# Patient Record
Sex: MALE | Race: WHITE | NOT HISPANIC OR LATINO | Employment: FULL TIME | URBAN - METROPOLITAN AREA
[De-identification: names, ages, dates, MRNs, and addresses within clinical notes are randomized per-mention and may not be internally consistent; named-entity substitution may affect disease eponyms.]

---

## 2021-09-17 ENCOUNTER — OFFICE VISIT (OUTPATIENT)
Dept: FAMILY MEDICINE CLINIC | Facility: CLINIC | Age: 31
End: 2021-09-17

## 2021-09-17 VITALS
HEIGHT: 71 IN | WEIGHT: 190.8 LBS | HEART RATE: 89 BPM | TEMPERATURE: 97.5 F | BODY MASS INDEX: 26.71 KG/M2 | SYSTOLIC BLOOD PRESSURE: 134 MMHG | DIASTOLIC BLOOD PRESSURE: 100 MMHG | OXYGEN SATURATION: 98 %

## 2021-09-17 DIAGNOSIS — Z11.59 NEED FOR HEPATITIS C SCREENING TEST: ICD-10-CM

## 2021-09-17 DIAGNOSIS — Z00.00 ENCOUNTER FOR ANNUAL PHYSICAL EXAM: Primary | ICD-10-CM

## 2021-09-17 DIAGNOSIS — R00.2 PALPITATIONS: ICD-10-CM

## 2021-09-17 DIAGNOSIS — R03.0 ELEVATED BLOOD-PRESSURE READING WITHOUT DIAGNOSIS OF HYPERTENSION: ICD-10-CM

## 2021-09-17 PROCEDURE — 99385 PREV VISIT NEW AGE 18-39: CPT | Performed by: NURSE PRACTITIONER

## 2021-09-17 PROCEDURE — 93000 ELECTROCARDIOGRAM COMPLETE: CPT | Performed by: NURSE PRACTITIONER

## 2021-09-17 RX ORDER — DIPHENOXYLATE HYDROCHLORIDE AND ATROPINE SULFATE 2.5; .025 MG/1; MG/1
TABLET ORAL DAILY
COMMUNITY
End: 2023-03-17

## 2021-09-17 NOTE — PATIENT INSTRUCTIONS
Monitor your blood pressure 2-3 times per week and record results.  Call if your blood pressure is consistently greater than 140/90.  Watch salt in your diet.  Continue to work on healthy diet and exercise.  Follow up pending lab results.  Follow up in 1 month, or sooner if problems or concerns.

## 2021-09-17 NOTE — PROGRESS NOTES
Subjective   Christ Hummel is a 31 y.o. male.     Chief Complaint   Patient presents with   • Annual Exam       History of Present Illness   New patient, here to establish care; no previous PCP; patient presents for CPE with fasting labs; pretty healthy diet; regular exercise, runs about 4-11 miles 4 days per week; goes to gym some and will take preworkout supplement prior to workout; has about 500 mg caffeine daily; regular dental visits; last eye exam about 3 years ago; no problems hearing; immunizations: needs Tdap, declines today; colonoscopy never performed; no family history of colon cancer.    Has been doing a lot of long distance running and HR seems to get high when running; has been running for about 2 years, just HR seems to be higher than in past; symptoms started about 6 months ago; will have to stop and walk at times due to SOA when HR higher than usual; may be related to heat or dehydration; when running HR will get up to 180s and will come back to baseline with rest; no noted irregular HR; has had some stress recently and at times of stress will note some palpitations; does not have caffeine prior to runs.    Brother diagnosed with testicular cancer last year; had lump in abdomen and neck; testicular cancer diagnosed as primary; pt does not have any noted lumps or abnormalities.    The following portions of the patient's history were reviewed and updated as appropriate: allergies, current medications, past family history, past medical history, past social history, past surgical history and problem list.    Current Outpatient Medications on File Prior to Visit   Medication Sig   • multivitamin (MULTI-VITAMIN DAILY PO) Take  by mouth Daily.     No current facility-administered medications on file prior to visit.        History reviewed. No pertinent past medical history.    Past Surgical History:   Procedure Laterality Date   • NO PAST SURGERIES         Family History   Problem Relation Age of Onset   •  Testicular cancer Brother 35   • Anemia Mother    • No Known Problems Father    • Coronary artery disease Paternal Grandmother        Social History     Socioeconomic History   • Marital status:      Spouse name: Not on file   • Number of children: Not on file   • Years of education: Not on file   • Highest education level: Not on file   Tobacco Use   • Smoking status: Never Smoker   • Smokeless tobacco: Never Used   Substance and Sexual Activity   • Alcohol use: Yes     Alcohol/week: 2.0 - 4.0 standard drinks     Types: 2 - 4 Cans of beer per week     Comment: socially   • Drug use: Never   • Sexual activity: Yes     Partners: Female     Birth control/protection: OCP       Review of Systems   Constitutional: Negative for appetite change, chills, fatigue (no unexplained fatigue), fever, unexpected weight gain and unexpected weight loss.   HENT: Negative for ear pain, sinus pressure, sore throat and trouble swallowing.    Eyes: Negative for blurred vision and pain.   Respiratory: Negative for cough and chest tightness. Shortness of breath: no problems with daily activities.    Cardiovascular: Negative for chest pain. Palpitations: see HPI.   Gastrointestinal: Negative for abdominal pain, blood in stool, constipation, diarrhea (rare, diet related) and GERD. Indigestion: occasional heartburn if not eating healthy.   Endocrine: Negative for cold intolerance and polydipsia (always thirsty, no change). Heat intolerance: some.   Genitourinary: Negative for dysuria, frequency, hematuria, nocturia, scrotal swelling and testicular pain.   Musculoskeletal: Negative for arthralgias, back pain and neck pain.   Skin: Negative for rash and skin lesions.   Neurological: Negative for dizziness, syncope, weakness, light-headedness and headache (rare bad headache with vomiting when a little dehydrated or feeling hot; maybe happens a couple of times per year).   Hematological: Does not bruise/bleed easily.  "  Psychiatric/Behavioral: Negative for depressed mood.     PHQ-9 Depression Screening  Little interest or pleasure in doing things? 0   Feeling down, depressed, or hopeless? 0   Trouble falling or staying asleep, or sleeping too much? 0   Feeling tired or having little energy? 0   Poor appetite or overeating? 0   Feeling bad about yourself - or that you are a failure or have let yourself or your family down? 0   Trouble concentrating on things, such as reading the newspaper or watching television? 1   Moving or speaking so slowly that other people could have noticed? Or the opposite - being so fidgety or restless that you have been moving around a lot more than usual? 0   Thoughts that you would be better off dead, or of hurting yourself in some way? 0   PHQ-9 Total Score 1   If you checked off any problems, how difficult have these problems made it for you to do your work, take care of things at home, or get along with other people?         Anxiety ALLYN score--5    Objective   Vitals:    09/17/21 1057 09/17/21 1202 09/17/21 1203   BP: 155/90 132/100 134/100   BP Location: Left arm Left arm Right arm   Patient Position: Sitting Sitting Sitting   Cuff Size: Adult     Pulse: 89     Temp: 97.5 °F (36.4 °C)     TempSrc: Infrared     SpO2: 98%     Weight: 86.5 kg (190 lb 12.8 oz)     Height: 180.3 cm (71\")     PainSc: 0-No pain       Body mass index is 26.61 kg/m².    Physical Exam  Vitals and nursing note reviewed.   Constitutional:       General: He is not in acute distress.     Appearance: He is well-developed and well-groomed. He is not diaphoretic.   HENT:      Head: Normocephalic and atraumatic.      Jaw: No tenderness or pain on movement (popping of right TMJ).      Right Ear: Tympanic membrane and external ear normal. No decreased hearing noted.      Left Ear: External ear normal. No decreased hearing noted. Left ear middle ear effusion: mild. Left ear injected TM: mild.      Nose: Nose normal.      Right Sinus: " No maxillary sinus tenderness or frontal sinus tenderness.      Left Sinus: No maxillary sinus tenderness or frontal sinus tenderness.      Mouth/Throat:      Mouth: Mucous membranes are dry.      Pharynx: No posterior oropharyngeal erythema. Oropharyngeal exudate: drainage noted in posterior pharynx.   Eyes:      Extraocular Movements: Extraocular movements intact.      Conjunctiva/sclera: Conjunctivae normal.      Pupils: Pupils are equal, round, and reactive to light.   Neck:      Thyroid: No thyroid mass or thyromegaly.      Vascular: No carotid bruit.      Trachea: No tracheal deviation.   Cardiovascular:      Rate and Rhythm: Normal rate and regular rhythm.      Pulses: Normal pulses.      Heart sounds: Normal heart sounds. No murmur heard.     Pulmonary:      Effort: Pulmonary effort is normal. No respiratory distress.      Breath sounds: Normal breath sounds.   Abdominal:      General: Bowel sounds are normal.      Palpations: Abdomen is soft. There is no hepatomegaly or splenomegaly.      Tenderness: There is no abdominal tenderness. There is no guarding.   Genitourinary:     Comments: Pt declined  Musculoskeletal:         General: Normal range of motion.      Cervical back: Normal range of motion and neck supple. No bony tenderness.      Thoracic back: No bony tenderness.      Lumbar back: No bony tenderness.      Right lower leg: No edema.      Left lower leg: No edema.   Lymphadenopathy:      Cervical: No cervical adenopathy.   Skin:     General: Skin is warm and dry.      Findings: No rash.   Neurological:      Mental Status: He is alert and oriented to person, place, and time.      Cranial Nerves: No cranial nerve deficit.      Motor: Motor function is intact.      Coordination: Coordination normal.      Gait: Gait normal.      Deep Tendon Reflexes: Reflexes are normal and symmetric.   Psychiatric:         Mood and Affect: Mood normal.         Behavior: Behavior normal.         Thought Content: Thought  content normal.         Cognition and Memory: Cognition normal.         Judgment: Judgment normal.             ECG 12 Lead    Date/Time: 9/17/2021 11:49 AM  Performed by: Liz Zuñiga APRN  Authorized by: Liz Zuñiga APRN   Comparison: not compared with previous ECG   Previous ECG: no previous ECG available  Rhythm: sinus rhythm and sinus bradycardia  Rate: normal  T inversion: III    Clinical impression: non-specific ECG            Assessment/Plan .  Problem List Items Addressed This Visit     Palpitations    Current Assessment & Plan     Decrease your intake of caffeine.         Relevant Orders    ECG 12 Lead    CBC & Differential    TSH Rfx On Abnormal To Free T4    Magnesium    Ambulatory Referral to Cardiology    Elevated blood-pressure reading without diagnosis of hypertension    Current Assessment & Plan     Monitor your blood pressure 2-3 times per week and record results.  Call if your blood pressure is consistently greater than 140/90.  Watch salt in your diet.  Continue to work on healthy diet.           Other Visit Diagnoses     Encounter for annual physical exam    -  Primary    Relevant Orders    Comprehensive Metabolic Panel    Lipid Panel With LDL / HDL Ratio    CBC & Differential    TSH Rfx On Abnormal To Free T4    Need for hepatitis C screening test        Relevant Orders    Hepatitis C Antibody          Return in about 1 month (around 10/17/2021) for Recheck.or sooner if symptoms persist or worsen.  Impression: Health maintenance visit.  Currently, eats a healthy diet and has a regular exercise routine.   Colorectal cancer screening: not indicated.  Prostate cancer screening: not indicated.  Screening lab work includes: CMP, lipid.  Immunizations: will consider Tdap at next OV; risks and benefits of immunizations were discussed with patient.  Patient was advised to be evaluated by ophthalmology.  Advice and education were given regarding nutrition and aerobic exercise.  Patient has noticed  HR into 180s at times when running, not all the time; pt notes more when weather is hot; pt runs long distances and no limitation of activity as long as HR stays around 150s; if HR seems to be running higher, pt will have to walk or slow down pace or else will get SOA; will refer to cardiology for further evaluation; pt instructed to avoid exertion to point of higher heart rate.  BP increased today; pt states he had high sodium meal last night due to preparing for event.     COVID-19 Precautions - Patient was compliant in wearing a mask. When I saw the patient, I used appropriate personal protective equipment (PPE) including mask, gloves, and eye shield (standard procedure).  Hand hygiene was completed before and after seeing the patient.

## 2021-09-18 LAB
ALBUMIN SERPL-MCNC: 5.2 G/DL (ref 4–5)
ALBUMIN/GLOB SERPL: 2.3 {RATIO} (ref 1.2–2.2)
ALP SERPL-CCNC: 47 IU/L (ref 44–121)
ALT SERPL-CCNC: 13 IU/L (ref 0–44)
AST SERPL-CCNC: 18 IU/L (ref 0–40)
BASOPHILS # BLD AUTO: 0 X10E3/UL (ref 0–0.2)
BASOPHILS NFR BLD AUTO: 1 %
BILIRUB SERPL-MCNC: 0.6 MG/DL (ref 0–1.2)
BUN SERPL-MCNC: 17 MG/DL (ref 6–20)
BUN/CREAT SERPL: 16 (ref 9–20)
CALCIUM SERPL-MCNC: 10.2 MG/DL (ref 8.7–10.2)
CHLORIDE SERPL-SCNC: 103 MMOL/L (ref 96–106)
CHOLEST SERPL-MCNC: 172 MG/DL (ref 100–199)
CO2 SERPL-SCNC: 24 MMOL/L (ref 20–29)
CREAT SERPL-MCNC: 1.07 MG/DL (ref 0.76–1.27)
EOSINOPHIL # BLD AUTO: 0.1 X10E3/UL (ref 0–0.4)
EOSINOPHIL NFR BLD AUTO: 1 %
ERYTHROCYTE [DISTWIDTH] IN BLOOD BY AUTOMATED COUNT: 12 % (ref 11.6–15.4)
GLOBULIN SER CALC-MCNC: 2.3 G/DL (ref 1.5–4.5)
GLUCOSE SERPL-MCNC: 99 MG/DL (ref 65–99)
HCT VFR BLD AUTO: 47.9 % (ref 37.5–51)
HCV AB S/CO SERPL IA: <0.1 S/CO RATIO (ref 0–0.9)
HDLC SERPL-MCNC: 60 MG/DL
HGB BLD-MCNC: 16.5 G/DL (ref 13–17.7)
IMM GRANULOCYTES # BLD AUTO: 0 X10E3/UL (ref 0–0.1)
IMM GRANULOCYTES NFR BLD AUTO: 1 %
LDLC SERPL CALC-MCNC: 101 MG/DL (ref 0–99)
LDLC/HDLC SERPL: 1.7 RATIO (ref 0–3.6)
LYMPHOCYTES # BLD AUTO: 1.4 X10E3/UL (ref 0.7–3.1)
LYMPHOCYTES NFR BLD AUTO: 23 %
MAGNESIUM SERPL-MCNC: 2.1 MG/DL (ref 1.6–2.3)
MCH RBC QN AUTO: 31 PG (ref 26.6–33)
MCHC RBC AUTO-ENTMCNC: 34.4 G/DL (ref 31.5–35.7)
MCV RBC AUTO: 90 FL (ref 79–97)
MONOCYTES # BLD AUTO: 0.4 X10E3/UL (ref 0.1–0.9)
MONOCYTES NFR BLD AUTO: 7 %
NEUTROPHILS # BLD AUTO: 4 X10E3/UL (ref 1.4–7)
NEUTROPHILS NFR BLD AUTO: 67 %
PLATELET # BLD AUTO: 224 X10E3/UL (ref 150–450)
POTASSIUM SERPL-SCNC: 4.7 MMOL/L (ref 3.5–5.2)
PROT SERPL-MCNC: 7.5 G/DL (ref 6–8.5)
RBC # BLD AUTO: 5.32 X10E6/UL (ref 4.14–5.8)
SODIUM SERPL-SCNC: 141 MMOL/L (ref 134–144)
TRIGL SERPL-MCNC: 56 MG/DL (ref 0–149)
TSH SERPL DL<=0.005 MIU/L-ACNC: 1.44 UIU/ML (ref 0.45–4.5)
VLDLC SERPL CALC-MCNC: 11 MG/DL (ref 5–40)
WBC # BLD AUTO: 5.9 X10E3/UL (ref 3.4–10.8)

## 2021-09-18 NOTE — ASSESSMENT & PLAN NOTE
Monitor your blood pressure 2-3 times per week and record results.  Call if your blood pressure is consistently greater than 140/90.  Watch salt in your diet.  Continue to work on healthy diet.

## 2022-03-16 ENCOUNTER — OFFICE VISIT (OUTPATIENT)
Dept: FAMILY MEDICINE CLINIC | Facility: CLINIC | Age: 32
End: 2022-03-16

## 2022-03-16 VITALS
OXYGEN SATURATION: 98 % | DIASTOLIC BLOOD PRESSURE: 80 MMHG | TEMPERATURE: 98 F | BODY MASS INDEX: 28.8 KG/M2 | HEIGHT: 70 IN | HEART RATE: 88 BPM | SYSTOLIC BLOOD PRESSURE: 120 MMHG | WEIGHT: 201.2 LBS

## 2022-03-16 DIAGNOSIS — R03.0 ELEVATED BLOOD-PRESSURE READING WITHOUT DIAGNOSIS OF HYPERTENSION: ICD-10-CM

## 2022-03-16 DIAGNOSIS — J30.9 ALLERGIC RHINITIS, UNSPECIFIED SEASONALITY, UNSPECIFIED TRIGGER: ICD-10-CM

## 2022-03-16 DIAGNOSIS — Z00.00 ENCOUNTER FOR ANNUAL PHYSICAL EXAM: Primary | ICD-10-CM

## 2022-03-16 DIAGNOSIS — J02.9 PHARYNGITIS, UNSPECIFIED ETIOLOGY: ICD-10-CM

## 2022-03-16 DIAGNOSIS — Z23 ENCOUNTER FOR IMMUNIZATION: ICD-10-CM

## 2022-03-16 PROCEDURE — 90715 TDAP VACCINE 7 YRS/> IM: CPT | Performed by: NURSE PRACTITIONER

## 2022-03-16 PROCEDURE — 99395 PREV VISIT EST AGE 18-39: CPT | Performed by: NURSE PRACTITIONER

## 2022-03-16 PROCEDURE — 90471 IMMUNIZATION ADMIN: CPT | Performed by: NURSE PRACTITIONER

## 2022-03-16 NOTE — ASSESSMENT & PLAN NOTE
Consider antihistamine, such as Claritin or Allegra.  May also consider nasal steroid, such as Flonase or Nasacort, if needed.

## 2022-03-16 NOTE — PROGRESS NOTES
Subjective   Christ Hummel is a 31 y.o. male.     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    F/U elevated BP/palpitations: monitored BP consistently for period of time after LOV and normal; monitors BP on occasion, typically runs 110s/70-80s; no problems with increased HR recently; attributes symptoms to the heat when was running and training for [employer] race; no recent intense workouts; did not see cardiology; has decreased intake of caffeine in general.    The following portions of the patient's history were reviewed and updated as appropriate: allergies, current medications, past family history, past medical history, past social history, past surgical history and problem list.    Current Outpatient Medications on File Prior to Visit   Medication Sig   • multivitamin (THERAGRAN) tablet tablet Take  by mouth Daily.     No current facility-administered medications on file prior to visit.        Past Medical History:   Diagnosis Date   • Elevated blood-pressure reading without diagnosis of hypertension 9/17/2021       Past Surgical History:   Procedure Laterality Date   • NO PAST SURGERIES         Family History   Problem Relation Age of Onset   • Testicular cancer Brother 35   • Anemia Mother    • No Known Problems Father    • Coronary artery disease Paternal Grandmother        Social History     Socioeconomic History   • Marital status:    Tobacco Use   • Smoking status: Never Smoker   • Smokeless tobacco: Never Used   Vaping Use   • Vaping Use: Never used  "  Substance and Sexual Activity   • Alcohol use: Yes     Alcohol/week: 2.0 - 4.0 standard drinks     Types: 2 - 4 Cans of beer per week     Comment: socially   • Drug use: Never   • Sexual activity: Yes     Partners: Female     Birth control/protection: OCP       Review of Systems   Constitutional: Negative for appetite change, chills, fatigue, fever, unexpected weight gain (has been more sedentary with new job) and unexpected weight loss.   HENT: Negative for ear pain, sinus pressure, sore throat and trouble swallowing.    Eyes: Negative for blurred vision and discharge.   Respiratory: Negative for cough, chest tightness and shortness of breath. Apnea: no snoring.    Cardiovascular: Negative for chest pain, palpitations and leg swelling.   Gastrointestinal: Negative for abdominal pain, blood in stool, constipation, diarrhea, GERD and indigestion.   Endocrine: Negative for cold intolerance, heat intolerance and polydipsia.   Genitourinary: Negative for dysuria, frequency, nocturia, scrotal swelling (does testicular self exams and no lumps/masses) and testicular pain.   Musculoskeletal: Negative for arthralgias and back pain.   Skin: Negative for rash and skin lesions.   Neurological: Negative for dizziness, syncope, weakness, light-headedness and headache.   Hematological: Does not bruise/bleed easily.   Psychiatric/Behavioral: Negative for sleep disturbance and depressed mood. The patient is not nervous/anxious.        Objective   Vitals:    03/16/22 0821 03/16/22 0858   BP: 116/70 120/80   BP Location: Left arm Left arm   Patient Position: Sitting Sitting   Cuff Size: Adult    Pulse: 88    Temp: 98 °F (36.7 °C)    SpO2: 98%    Weight: 91.3 kg (201 lb 3.2 oz)    Height: 177.8 cm (70\")      Body mass index is 28.87 kg/m².    Physical Exam  Vitals and nursing note reviewed.   Constitutional:       General: He is not in acute distress.     Appearance: He is well-developed and well-groomed. He is not ill-appearing or " diaphoretic.   HENT:      Head: Normocephalic and atraumatic.      Jaw: No tenderness or pain on movement (popping of right TMJ).      Right Ear: Tympanic membrane and external ear normal. No decreased hearing noted.      Left Ear: External ear normal. No decreased hearing noted. A middle ear effusion is present. Left ear injected TM: mild.      Nose: Nose normal.      Right Turbinates: Swollen. Not pale.      Left Turbinates: Swollen. Not pale.      Right Sinus: No maxillary sinus tenderness or frontal sinus tenderness.      Left Sinus: No maxillary sinus tenderness or frontal sinus tenderness.      Mouth/Throat:      Mouth: Mucous membranes are moist.      Pharynx: No oropharyngeal exudate. Posterior oropharyngeal erythema: mild in posterior pharynx.      Comments: Bilateral tonsils 2-3+  Eyes:      Extraocular Movements: Extraocular movements intact.      Conjunctiva/sclera: Conjunctivae normal.      Pupils: Pupils are equal, round, and reactive to light.   Neck:      Thyroid: No thyromegaly.      Vascular: No carotid bruit.      Trachea: No tracheal deviation.   Cardiovascular:      Rate and Rhythm: Normal rate and regular rhythm.      Pulses: Normal pulses.      Heart sounds: Normal heart sounds. No murmur heard.  Pulmonary:      Effort: Pulmonary effort is normal. No respiratory distress.      Breath sounds: Normal breath sounds.   Abdominal:      General: Bowel sounds are normal.      Palpations: Abdomen is soft. There is no hepatomegaly or splenomegaly.      Tenderness: There is no abdominal tenderness. There is no guarding.   Musculoskeletal:         General: Normal range of motion.      Cervical back: Normal range of motion and neck supple. No bony tenderness.      Thoracic back: No bony tenderness.      Lumbar back: No bony tenderness.      Right lower leg: No edema.      Left lower leg: No edema.   Lymphadenopathy:      Cervical: No cervical adenopathy.   Skin:     General: Skin is warm and dry.       Findings: No rash.   Neurological:      Mental Status: He is alert and oriented to person, place, and time.      Cranial Nerves: No cranial nerve deficit.      Motor: Motor function is intact.      Coordination: Coordination normal.      Gait: Gait normal.      Deep Tendon Reflexes: Reflexes are normal and symmetric.   Psychiatric:         Mood and Affect: Mood normal.         Behavior: Behavior normal.         Thought Content: Thought content normal.         Cognition and Memory: Cognition normal.         Judgment: Judgment normal.         Lab Results   Component Value Date    WBC 5.9 09/17/2021    RBC 5.32 09/17/2021    HGB 16.5 09/17/2021    HCT 47.9 09/17/2021    MCV 90 09/17/2021    MCH 31.0 09/17/2021    MCHC 34.4 09/17/2021    RDW 12.0 09/17/2021     09/17/2021    NEUTRORELPCT 67 09/17/2021    LYMPHORELPCT 23 09/17/2021    MONORELPCT 7 09/17/2021    EOSRELPCT 1 09/17/2021    BASORELPCT 1 09/17/2021    NEUTROABS 4.0 09/17/2021    LYMPHSABS 1.4 09/17/2021    MONOSABS 0.4 09/17/2021    EOSABS 0.1 09/17/2021    BASOSABS 0.0 09/17/2021     Lab Results   Component Value Date    GLUCOSE 99 09/17/2021    BUN 17 09/17/2021    CREATININE 1.07 09/17/2021    EGFRIFNONA 91 09/17/2021    EGFRIFAFRI 106 09/17/2021    BCR 16 09/17/2021    K 4.7 09/17/2021    CO2 24 09/17/2021    CALCIUM 10.2 09/17/2021    PROTENTOTREF 7.5 09/17/2021    ALBUMIN 5.2 (H) 09/17/2021    LABIL2 2.3 (H) 09/17/2021    AST 18 09/17/2021    ALT 13 09/17/2021      Lab Results   Component Value Date    CHLPL 172 09/17/2021    TRIG 56 09/17/2021    HDL 60 09/17/2021    VLDL 11 09/17/2021     (H) 09/17/2021     Lab Results   Component Value Date    TSH 1.440 09/17/2021       Assessment/Plan .  Problem List Items Addressed This Visit     Pharyngitis    Relevant Orders    CBC & Differential    Allergic rhinitis    Current Assessment & Plan     Consider antihistamine, such as Claritin or Allegra.  May also consider nasal steroid, such as  Flonase or Nasacort, if needed.           RESOLVED: Elevated blood-pressure reading without diagnosis of hypertension      Other Visit Diagnoses     Encounter for annual physical exam    -  Primary    Relevant Orders    Tdap Vaccine Greater Than or Equal To 6yo IM (Completed)    Comprehensive Metabolic Panel    CBC & Differential    Encounter for immunization        Relevant Orders    Tdap Vaccine Greater Than or Equal To 6yo IM (Completed)          Return in about 1 year (around 3/16/2023) for Annual physical, Recheck.or sooner if problems or concerns.  Impression: Health maintenance visit.  Currently, eats a pretty healthy diet and has a regular exercise routine.  Colorectal cancer screening: not indicated.  Prostate cancer screening: not indicated.  Screening lab work includes: CMP.  Immunizations: Tdap; risks and benefits of immunizations were discussed with patient.  Patient was advised to be evaluated by ophthalmology.  Advice and education were given regarding nutrition and aerobic exercise.         COVID-19 Precautions - Patient was compliant in wearing a mask. When I saw the patient, I used appropriate personal protective equipment (PPE) including mask, gloves, and eye shield (standard procedure).  Hand hygiene was completed before and after seeing the patient.

## 2022-03-16 NOTE — PATIENT INSTRUCTIONS
Continue to monitor your blood pressure periodically and record results.  Continue to work on healthy diet and exercise.  Consider antihistamine, such as Claritin or Allegra.  May also consider nasal steroid, such as Flonase or Nasacort, if needed.  Follow up pending lab results.  Follow up in 1 year for physical with fasting labs, or sooner if problems or concerns.

## 2022-03-17 LAB
ALBUMIN SERPL-MCNC: 5.2 G/DL (ref 4–5)
ALBUMIN/GLOB SERPL: 2.6 {RATIO} (ref 1.2–2.2)
ALP SERPL-CCNC: 50 IU/L (ref 44–121)
ALT SERPL-CCNC: 25 IU/L (ref 0–44)
AST SERPL-CCNC: 21 IU/L (ref 0–40)
BASOPHILS # BLD AUTO: 0 X10E3/UL (ref 0–0.2)
BASOPHILS NFR BLD AUTO: 0 %
BILIRUB SERPL-MCNC: 0.7 MG/DL (ref 0–1.2)
BUN SERPL-MCNC: 15 MG/DL (ref 6–20)
BUN/CREAT SERPL: 15 (ref 9–20)
CALCIUM SERPL-MCNC: 9.9 MG/DL (ref 8.7–10.2)
CHLORIDE SERPL-SCNC: 102 MMOL/L (ref 96–106)
CO2 SERPL-SCNC: 25 MMOL/L (ref 20–29)
CREAT SERPL-MCNC: 1.01 MG/DL (ref 0.76–1.27)
EGFRCR SERPLBLD CKD-EPI 2021: 101 ML/MIN/1.73
EOSINOPHIL # BLD AUTO: 0.1 X10E3/UL (ref 0–0.4)
EOSINOPHIL NFR BLD AUTO: 2 %
ERYTHROCYTE [DISTWIDTH] IN BLOOD BY AUTOMATED COUNT: 13 % (ref 11.6–15.4)
GLOBULIN SER CALC-MCNC: 2 G/DL (ref 1.5–4.5)
GLUCOSE SERPL-MCNC: 93 MG/DL (ref 65–99)
HCT VFR BLD AUTO: 47.7 % (ref 37.5–51)
HGB BLD-MCNC: 16.5 G/DL (ref 13–17.7)
IMM GRANULOCYTES # BLD AUTO: 0 X10E3/UL (ref 0–0.1)
IMM GRANULOCYTES NFR BLD AUTO: 0 %
LYMPHOCYTES # BLD AUTO: 1.5 X10E3/UL (ref 0.7–3.1)
LYMPHOCYTES NFR BLD AUTO: 30 %
MCH RBC QN AUTO: 30.7 PG (ref 26.6–33)
MCHC RBC AUTO-ENTMCNC: 34.6 G/DL (ref 31.5–35.7)
MCV RBC AUTO: 89 FL (ref 79–97)
MONOCYTES # BLD AUTO: 0.4 X10E3/UL (ref 0.1–0.9)
MONOCYTES NFR BLD AUTO: 8 %
NEUTROPHILS # BLD AUTO: 2.9 X10E3/UL (ref 1.4–7)
NEUTROPHILS NFR BLD AUTO: 60 %
PLATELET # BLD AUTO: 225 X10E3/UL (ref 150–450)
POTASSIUM SERPL-SCNC: 4.7 MMOL/L (ref 3.5–5.2)
PROT SERPL-MCNC: 7.2 G/DL (ref 6–8.5)
RBC # BLD AUTO: 5.38 X10E6/UL (ref 4.14–5.8)
SODIUM SERPL-SCNC: 142 MMOL/L (ref 134–144)
WBC # BLD AUTO: 4.9 X10E3/UL (ref 3.4–10.8)

## 2022-12-01 ENCOUNTER — E-VISIT (OUTPATIENT)
Dept: FAMILY MEDICINE CLINIC | Facility: TELEHEALTH | Age: 32
End: 2022-12-01
Payer: COMMERCIAL

## 2022-12-01 PROCEDURE — BRIGHTMDVISIT: Performed by: NURSE PRACTITIONER

## 2022-12-01 NOTE — EXTERNAL PATIENT INSTRUCTIONS
Note   Drink plenty of water Over the counter pain relievers okay If symptoms do not improve in 3-5 days follow up with your primary care provider or urgent care   Diagnosis   Bacterial sinusitis   My name is Alecia Mattson, and I'm a healthcare provider at [facility]. I reviewed your interview, and I see that you have bacterial sinusitis.   To prevent the spread of illness to others, I recommend that you stay home and away from other people as much as possible while you're sick. When you need to be around other people, consider wearing a face mask.   Medications   Your pharmacy   [facility] [address] [facility] [address] [phone]     Prescription   Amoxicillin-clavulanate (875mg/125mg): Take 1 tablet by mouth twice a day for 7 days for infection. This medication is an antibiotic. Take it exactly as directed. You must finish the entire course of medication, even if you feel better after the first few days of treatment.    Start taking the antibiotics I've prescribed right away. You need to finish the entire course of antibiotics, even if you start to feel better before the pills run out.   Non-prescription   Fluticasone (50mcg): Spray 2 times in each nostril daily for nasal symptoms due to allergies or sinusitis. Fluticasone needs to be used every day to be effective. It may take up to a week for the full effects of the medication to be seen. Brands to look for include Flonase.   About your diagnosis   The sinuses are hollow spaces connected to the nasal passages. Sinusitis occurs when the sinuses swell and block the drainage of fluid and mucus from the nose, causing pain, pressure, and congestion. Fatigue, difficulty sleeping, or decreased appetite may accompany your symptoms.   More than 90% of sinus infections are caused by viruses. However, in certain cases, a sinus infection may be caused by bacteria. Bacterial sinus infections usually look like one of the following cases:     Severe sinus symptoms with a fever over 102F.    Sinus symptoms that have not improved at all after 10 days.    Cold symptoms that slowly improve but then worsen again after 5 or 6 days, usually with a high fever, headache, or nasal discharge.   What to expect   If you follow this treatment plan, you should start to feel better within a few days.   When to seek care   Call us at [phone]   with any sudden or unexpected symptoms.    Symptoms that last longer than 10 to 14 days.    Symptoms that get better for a few days, and then suddenly get worse.    Fever that measures over 103F or continues for more than 3 days.    Any vision changes.    A worsening headache.    Stiff neck.    Swelling of your forehead or eyes.    Coughing up red or bloody mucus.    Swallowing becomes extremely difficult or impossible.    More than 5 episodes of diarrhea in a day.    More than 5 episodes of vomiting in a day.    Severe shortness of breath.    Severe chest pain   Other treatment    Rest! Your body needs rest to recover and fight infection.    Drink plenty of water to stay hydrated.    Use steam to soothe your sinuses: Breathe it in from a shower or a bowl of hot water. Placing a warm, moist washcloth over your nose and forehead may help relieve the sinus pain and pressure.    Try non-prescription saline nasal sprays to help your nasal symptoms. Try using a Neti Pot to flush out your stuffy nose and sinuses. Neti Pots are available at any drugstore without a prescription.    Avoid smoke and air pollution. Smoke can make infections worse.   Prevention    Avoid close contact with other people when you're sick.    Cover your mouth and nose when you cough or sneeze. Use a tissue or cough into your elbow. Make sure that used tissues go directly into the trash.    Avoid touching your eyes, nose, or mouth while you're sick.    Wash your hands often, especially after coughing, sneezing, or blowing your nose. If soap and water are  not available, use an alcohol-based hand .    If you or someone in your home or workplace is sick, disinfect commonly used items. This includes door handles, tables, computers, remotes, and pens.    Coronavirus (COVID-19) information   Common symptoms of COVID-19 include fever, cough, shortness of breath, fatigue, muscle or body aches, headaches, new loss of sense of taste or smell, sore throat, stuffy or runny nose, nausea or vomiting, and diarrhea. Most people who get COVID-19 have mild symptoms and can rest at home until they get better. Elderly people and those with chronic medical problems may be at risk for more serious complications.   FAQs about the COVID-19 vaccine   There are four authorized COVID-19 vaccines: [employer]'s Kristine Vaccine (J&J/Kristine), Moderna, Novavax, and Pfizer-BioNTech (Pfizer). The J&J/Kristine and Novavax vaccines are approved for use in people aged 18 and older. The Moderna and Pfizer vaccines are approved for those aged 6 months and older. All four are available at no cost. Even if you don't have health insurance, you can still get the COVID-19 vaccine for free.   Which vaccine is the best? Which vaccine should I get?   All four vaccines are highly effective. Even if you get COVID-19 after being vaccinated, all of the vaccines help prevent severe disease, hospitalization, and complications.   Most people should get whichever vaccine is first available to them. However, women younger than 50 years old should consider the rare risk of blood clots with low platelets after vaccination with the J&J/Kristine vaccine. This risk hasn't been seen with the other three vaccines.   Are the vaccines safe?   Yes. Hundreds of millions of people in the [place] have already safely received COVID-19 vaccines under the most intense safety monitoring in the history of the [place].   Do I need the vaccine if I've already had COVID?   Yes. Vaccination helps protect you even if you've already had  COVID.   If you had COVID-19 and had symptoms, wait to get vaccinated until you've recovered and completed your isolation period.   If you tested positive for COVID-19 but did not have symptoms, you can get vaccinated after 5 full days have passed since you had a positive test, as long as you don't develop symptoms.   How many doses of the vaccine do I need?   Visit www.cdc.gov/coronavirus/2019-ncov/vaccines/stay-up-to-date.html   to find out how to stay up to date with your COVID-19 vaccines.   I'm immunocompromised. How many doses of the vaccine do I need?   For information on how immunocompromised people can stay up to date with their COVID-19 vaccines, visit www.cdc.gov/coronavirus/2019-ncov/vaccines/recommendations/immuno.html  .   What are the common side effects of the vaccine?   A sore arm, tiredness, headache, and muscle pain may occur within two days of getting the vaccine and last a day or two. For the Moderna or Pfizer vaccines, side effects are more common after the second dose. People over the age of 55 are less likely to have side effects than younger people.   After I'm up to date on vaccines, can I still get or spread COVID?   Yes, you can still get COVID, but your disease should be milder. And your risk of serious illness, hospitalization, and complications will be much lower, especially if you're up to date. Unfortunately, you can still spread COVID if you've been vaccinated. That's why it's important to follow isolation guidelines if you get sick or test positive.   After I'm up to date on vaccines, can I go back to normal?   You should still wear a mask indoors in public if:    It's required by laws, rules, regulations, or local guidance.    You have a weakened immune system.    Your age puts you at increased risk of severe disease.    You have a medical condition that puts you at increased risk of severe disease.    Someone in your household has a weakened immune system, is at increased risk  for severe disease, or is unvaccinated.    You're in an area of high transmission.   Where can I get a COVID-19 vaccine?   Visit [facility]'s website for more information. To find a COVID-19 vaccination site near you, visit www.vaccines.gov/  , call [phone]  , or text your zip code to 927486 ([place]). Message and data rates may apply.   What about travel?   Travel increases your risk of exposure to COVID-19. For more information, see www.cdc.gov/coronavirus/2019-ncov/travelers/index.html  .   I've had close contact with someone who has COVID. Do I need to quarantine, and if so, for how long?   For the most current answer, including a calculator to determine whether you need to stay home and for how long, visit www.cdc.gov/coronavirus/2019-ncov/your-health/quarantine-isolation.html  .   I've tested positive for COVID. How long do I need to isolate?   For the latest recommendations, including a calculator to determine how long you need to stay home, visit www.cdc.gov/coronavirus/2019-ncov/your-health/quarantine-isolation.html  .   What if I develop symptoms that might be from COVID?   For the latest recommendations on what to do if you're sick, including when to seek emergency care, visit www.cdc.gov/coronavirus/2019-ncov/if-you-are-sick/steps-when-sick.html  .    Flu vaccine information   Who should get a flu vaccine?   Everyone 6 months of age and older should get a yearly flu vaccine.   When should I get vaccinated?   You should get a flu vaccine by the end of October. Once you're vaccinated, it takes about two weeks for antibodies to develop and protect you against the flu. That's why it's important to get vaccinated as soon as possible.   After October, is it too late to get vaccinated?   No. You should still get vaccinated. As long as the flu viruses are still in your community, flu vaccines will remain available, even into January of next year or later.   Why do I need a flu vaccine EVERY year?   Flu  viruses are constantly changing, so flu vaccines are usually updated from one season to the next. Your protection from the flu vaccine also lessens over time.   Is the flu vaccine safe?   Yes. Over the last 50 years, hundreds of millions of Americans have safely received the flu vaccines.   What are the side effects of flu vaccines?   You CANNOT get the flu from a flu vaccine. Common side effects of the flu shot include soreness, redness and/or swelling where the shot was given, low grade fever, and aches. Common side effects of the nasal spray flu vaccine for adults include runny nose, headaches, sore throat, and cough. For children, side effects include wheezing, vomiting, muscle aches, and fever.   Does the flu vaccine increase your risk of getting COVID-19?   No. There is no evidence that getting a flu vaccine increases your risk of getting COVID-19.   Is it safe to get the flu vaccine along with a COVID-19 vaccine?   Yes. It's safe to get the flu vaccine with a COVID-19 vaccine or booster.   Contact your healthcare provider TODAY for details on when and where to get your flu vaccine.   Your provider   Your diagnosis was provided by Alecia Mattson, a member of your trusted care team at [facility].   If you have any questions, call us at [phone]  .

## 2022-12-01 NOTE — E-VISIT TREATED
Chief Complaint: Coronavirus (COVID-19), cold, sinus pain, allergy, or flu   Patient introduction   Patient is 32-year-old male with cough, congestion, nasal discharge, and voice hoarseness that started 3 to 5 days ago.   COVID-19 exposure, testing history, and vaccination status:    No known exposure to a person with a confirmed or suspected case of COVID-19.    No recent travel outside of their local community.    Patient had a self-test within the last week. Patient specifies date of test as 11/30/2022. Test result was negative.    Received 1 dose of the COVID-19 vaccine (Moderna).   Received the vaccine more than 14 days ago.   Risk factors for severe disease from COVID-19 infection:    BMI >= 25.   Patient did not request an excuse note.   General presentation   Symptoms came on gradually.   Fever:    No fever.   Sinus and nasal symptoms:    Nasal discharge.    Yellow nasal drainage.    Nasal drainage is thick.    Postnasal drip.    Congestion without associated pain or pressure.    No itchy nose or sneezing.   Throat symptoms:    Voice is severely hoarse. Patient does not believe hoarseness is due to voice strain.    No sore throat.   Head and body aches:    No headache.    No sweats.    No chills.    No myalgia.    No fatigue.   Cough:    Cough is worse at night/while sleeping.    Cough is mildly productive of sputum.    Describes color of sputum as yellow.   Wheezing and shortness of breath:    No COPD diagnosis.    No asthma diagnosis.    No wheezing.    No shortness of breath.    No previous albuterol inhaler use for URIs, bronchitis, or pneumonia.    No previous steroid inhaler use for URIs, bronchitis, or pneumonia.   Chest pain:    No chest pain.   Ear symptoms:    None.   Dizziness:    No dizziness.   Allergies:    No history of allergies.   Flu exposure:    No recent known exposure to a person with a confirmed flu diagnosis.    Has not had a flu vaccine this season.   Not taking any  over-the-counter medications for current symptoms.   Review of red flags/alarm symptoms:    No changes in alertness or awareness.    No decreased urination.   Risk factors for antibiotic resistance:    Close contact with a child in .   Self-exam:    Height: 180 centimeters    Weight: 90.7 kilograms    No difficulty moving their chin toward their chest.    Neck lymph nodes feel normal.    Has not taken antibiotics for similar symptoms within the past month.   Current medications   Not taking other medications or supplements.   Medication allergies   None.   Medication contraindication review   No history of anaphylactic reaction to beta-lactam antibiotics; aspirin triad; blood dyscrasia; bone marrow depression; catecholamine-releasing paraganglioma; coronary artery disease; coagulation disorder; congenital long QT syndrome; depression; electrolyte abnormalities; fungal infection; GI bleeding; GI obstruction; G6PD deficiency; heart arrhythmia; hypertension; mononucleosis; myasthenia; recent myocardial infarction; NSAID-induced asthma/urticaria; Parkinson's disease; pheochromocytoma; porphyria; Reye syndrome; seizure disorder; ulcerative colitis; and urinary retention.   No history of metoclopramide-associated dystonic reaction and tardive dyskinesia.   No known history of amoxicillin-clavulanate-associated cholestatic jaundice or hepatic impairment.   No known history of azithromycin-associated cholestatic jaundice or hepatic impairment.   Past medical history   Immune conditions: No immunocompromising conditions. No history of cancer.   Social history   High-risk household contacts: Patient's household includes one or more members of a group with risk factors for influenza complications, including a child younger than 5 years.   Never smoked tobacco.   Assessment   Bacterial sinusitis. Ruled out: Traumatic laryngitis.   This is the likely diagnosis based on patient's interview responses and symptoms,  including:    Congestion or sinus pressure.    Thick nasal discharge.    Yellow or green mucus.    Short duration of symptoms indicating severe symptoms at onset   Plan   Medications:    amoxicillin 875 mg-potassium clavulanate 125 mg tablet RX 875mg/125mg 1 tab PO bid 7d for infection. This medication is an antibiotic. Take it exactly as directed. You must finish the entire course of medication, even if you feel better after the first few days of treatment. Amount is 14 tab.    fluticasone 50 mcg/actuation nasal spray,suspension OTC 50mcg 2 sprays each nostril nasal qd 30d for nasal symptoms due to allergies or sinusitis. Fluticasone needs to be used every day to be effective. It may take up to a week for the full effects of the medication to be seen. Brands to look for include Flonase. Amount is 16 g.   The patient's prescription will be sent to:   [facility]   [address] [facility]. [address] [place], [place] [address]   Phone: [phone]     Fax: [phone]   Patient informed to purchase OTC medication.   Education:    Condition and causes    Prevention    Treatment and self-care    When to call provider   ----------   Electronically signed by FRANSISCA Aaron on 2022-12-01 at 04:47AM   ----------   Patient Interview Transcript:   Please carefully consider each question and answer as best you can. This helps your provider give you the best care. Which of these symptoms are bothering you? Select all that apply.    Cough    Stuffed-up nose or sinuses    Runny nose    Hoarse voice or loss of voice   Not selected:    Shortness of breath    Fever    Itchy or watery eyes    Itchy nose or sneezing    Loss of smell or taste    Sore throat    Headache    Sweats    Chills    Muscle or body aches    Fatigue or tiredness    Nausea or vomiting    Diarrhea    I don't have any of these symptoms   Since your current symptoms started, have you been tested for COVID-19? Select one.    Yes   Not selected:    No   When was your  most recent COVID-19 test? Select one.    Within the last week (specify date as MM/DD/YY): 11/30/2022   Not selected:    7 to 14 days ago    15 to 30 days ago    More than 1 month ago   What type of COVID-19 test did you most recently have? There are two types of COVID-19 tests: - Viral tests check if you're currently infected with COVID-19. For these tests, a nose swab or saliva sample is taken. Viral tests include self-tests and tests done at a doctor's office, lab, or testing site. - Antibody tests check if you've been infected in the past. For these tests, your blood is drawn. Antibody tests can only be done at a doctor's office, lab, or testing site. Select one.    Viral self-test   Not selected:    Viral test at a doctor's office, lab, or testing site    Antibody test   What was the result of your most recent COVID-19 test? Select one.    Negative   Not selected:    Positive    I'm not sure   Have you gotten the COVID-19 vaccine? Select one.    Yes   Not selected:    No   How many total doses of the COVID-19 vaccine have you gotten? This includes boosters as well as additional doses for those who are immunocompromised. Select one.    1 dose   Not selected:    2 doses    3 doses    4 doses    5 doses   Which COVID-19 vaccine did you get? Check your Vaccination Record Card under Product Name/. Select one.    Moderna   Not selected:    Efrain & Efrain's Kristine Vaccine (J&J/Kristine)    Novavax    Pfizer-BioNTParacosm (Pfizer)   When did you get your COVID-19 vaccine? Select one.    More than 14 days ago   Not selected:    Less than 48 hours (2 days) ago    48 to 72 hours (3 days) ago    3 to 5 days ago    5 to 7 days ago    7 to 14 days ago   In the last 14 days, have you traveled outside of your local community? This includes travel by car, RV, bus, train, or plane. Travel increases your chances of getting and spreading COVID-19. Select one.    No   Not selected:    Yes   In the last 14 days, have you  "had close contact with someone who has coronavirus (COVID-19)? \"Close contact\" means any of these: - Living in the same household as someone with COVID-19. - Caring for someone with COVID-19. - Being within 6 feet of someone with COVID-19 for a total of at least 15 minutes over a 24-hour period. For example, three 5-minute exposures for a total of 15 minutes. - Being in direct contact with respiratory droplets from someone with COVID-19 (being coughed on, kissing, sharing utensils). Select one.    No, not that I know of   Not selected:    Yes, a confirmed case    Yes, a suspected case   When did your current symptoms start? Select one.    3 to 5 days ago   Not selected:    Less than 48 hours ago    6 to 9 days ago    10 to 14 days ago    2 to 3 weeks ago    3 to 4 weeks ago    More than a month ago   Do you know the exact date your symptoms started? If so, enter the date as MM/DD/YY. Select one.    No   Not selected:    Yes (specify)   Did your symptoms come on suddenly or gradually? Select one.    Gradually   Not selected:    Suddenly    I'm not sure   Do you cough so hard that it's made you gag or vomit? By gag, we mean has your coughing made you choke or dry heave? Select all that apply.    No   Not selected:    Yes, my coughing has made me gag    Yes, my coughing has made me vomit   When is your cough the worst? Select all that apply.    At nighttime, or while I'm sleeping   Not selected:    In the morning, or when I wake up    During the day    I haven't noticed a difference depending on time of day   Are you coughing up mucus or phlegm? Select one.    Yes, a little   Not selected:    No, my cough is dry    Yes, a lot   What color is most of the mucus or phlegm that you're coughing up? Select one.    Yellow   Not selected:    Clear    White/frothy    Green    Red or pink    I'm not sure   You mentioned having a stuffy nose or sinus congestion. Do you feel pain or pressure in your sinuses?    No   Not " "selected:    Yes   What color is your nasal drainage? Select one.    Yellow   Not selected:    Clear    White    Green    My nose is stuffed but not draining or running    I'm not sure   Is your nasal drainage thick or thin? Select one.    Thick   Not selected:    Thin   Is there any drainage (mucus) going down the back of your throat? This kind of drainage is also called \"postnasal drip.\" Select one.    Yes   Not selected:    No, not that I know of   Since your symptoms started, have you felt dizzy? Select one.    No   Not selected:    Yes, but I can continue with my regular daily activities    Yes, and it makes it hard to stand, walk, or do daily activities   Do you have chest pain? You might also feel it as discomfort, aching, tightness, or squeezing in the chest. Select one.    No   Not selected:    Yes   Have you urinated at least 3 times in the last 24 hours? Select one.    Yes   Not selected:    No   Changes in alertness or awareness may mean you need emergency care. Since your symptoms started, have you had any of these? Select all that apply.    None of the above   Not selected:    Confusion    Slurred speech    Not knowing where you are or what day it is    Difficulty staying conscious    Fainting or passing out   Do your symptoms include a whistling sound, or wheezing, when you breathe? Select one.    No   Not selected:    Yes    I'm not sure   Early in this interview, you told us you were hoarse or you'd lost your voice. How would you describe the changes to your voice? Select one.    I can barely talk at all   Not selected:    It just sounds a little raspy    It's harder than usual to talk   Is it possible that you strained your voice? Singing, yelling, or talking more or louder than usual can cause voice strain. Select one.    No, not that I know of   Not selected:    Yes   Do you have any of these symptoms in your ear(s)? Select all that apply.    None of the above   Not selected:    Pain   "  Pressure    Fullness    Crackling or popping    Plugged or blocked sensation   Can you move your chin toward your chest?    Yes   Not selected:    No, my neck is too stiff   Are your glands/lymph nodes swollen, or does it hurt when you touch them?    No, not that I can tell   Not selected:    Yes   In the past week, has anyone around you (such as at school, work, or home) had a confirmed diagnosis of the flu? A confirmed diagnosis means that a nose swab was done to verify a flu infection. Select all that apply.    No, not that I know of   Not selected:    I live with someone who has the flu    I've been within touching distance of someone who has the flu    I've walked by, or sat about 3 feet away from, someone who has the flu    I've been in the same building as someone who has the flu   Have you ever been diagnosed with asthma? Select one.    No   Not selected:    Yes   Have you ever been prescribed albuterol to use for wheezing, cough, or shortness of breath caused by a cold, bronchitis, or pneumonia? Albuterol (ProAir, Proventil, Ventolin) is prescribed as an inhaler or a solution to be used with a nebulizer machine. Select one.    No, not that I know of   Not selected:    Yes   Have you ever been prescribed a steroid inhaler to use for wheezing, cough, or shortness of breath caused by a cold, bronchitis, or pneumonia? Some examples of steroid inhalers include Pulmicort, Flovent, Qvar, and Alvesco. Select one.    No, not that I know of   Not selected:    Yes   Have you ever been diagnosed with chronic obstructive pulmonary disease (COPD)? Select one.    No, not that I know of   Not selected:    Yes   In the past month, have you taken antibiotics for similar symptoms? Examples of antibiotics include amoxicillin, amoxicillin-clavulanate (Augmentin), penicillin, cefdinir (Omnicef), doxycycline, and clindamycin (Cleocin). Select one.    No   Not selected:    Yes   Do you have allergies (pollen, dust mites, mold,  animal dander)? Select one.    No, not that I know of   Not selected:    Yes   Have you had a flu shot this season? Select one.    No, not that I know of   Not selected:    Yes, less than 2 weeks ago    Yes, 2 to 4 weeks ago    Yes, 1 to 3 months ago    Yes, 3 to 6 months ago    Yes, more than 6 months ago   The flu and COVID-19 can be more serious for people with certain conditions or characteristics. These questions help us figure out if you or anyone you live with is at higher risk for complications from these infections. Do either of these statements apply to you? Select all that apply.    None of the above   Not selected:    I'm  or Native [place]    I'm a healthcare worker   Do you smoke tobacco? Select one.    No   Not selected:    Yes, every day    Yes, some days    No, I quit   Do you have any of these conditions? Select all that apply.    None of the above   Not selected:    Chronic lung disease, such as cystic fibrosis or interstitial fibrosis    Heart disease, such as congenital heart disease, congestive heart failure, or coronary artery disease    Disorder of the brain, spinal cord, or nerves and muscles, such as dementia, cerebral palsy, epilepsy, muscular dystrophy, or developmental delay    Metabolic disorder or mitochondrial disease    Cerebrovascular disease, such as stroke or another condition affecting the blood vessels or blood supply to the brain    Down syndrome    Mood disorder, including depression or schizophrenia spectrum disorders    Substance use disorder, such as alcohol, opioid, or cocaine use disorder    Tuberculosis   Do you live in a group care setting? Examples include: - Nursing home - Residential care - Psychiatric treatment facility - Group home - [facility] - Board and care home - Homeless shelter - Foster care setting Select one.    No   Not selected:    Yes   Are you a healthcare worker? Select one.    No   Not selected:    Yes   People with a very high body  mass index (BMI) are at higher risk for developing complications from the flu and severe illness from COVID-19. To determine your BMI, we need to know your weight and height. Please enter your weight (in pounds).    Weight   Please enter your height.    Height   Do you have any of these conditions that can affect the immune system? Scroll to see all options. Select all that apply.    None of these   Not selected:    History of bone marrow transplant    Chronic kidney disease    Chronic liver disease (including cirrhosis)    HIV/AIDS    Inflammatory bowel disease (Crohn's disease or ulcerative colitis)    Lupus    Moderate to severe plaque psoriasis    Multiple sclerosis    Rheumatoid arthritis    Sickle cell anemia    Alpha or beta thalassemia    History of solid organ transplant (kidney, liver, or heart)    History of spleen removal    An autoimmune disorder not listed here    A condition requiring treatment with long-term use of oral steroids (such as prednisone, prednisolone, or dexamethasone)   Have you ever been diagnosed with cancer? Select one.    No   Not selected:    Yes, I have cancer now    Yes, but I'm in remission   Do any of these apply to you? Select all that apply.    I'm in close contact with a child in    Not selected:    I've been hospitalized within the last 5 days    I have diabetes    None of the above   Do any of these apply to the people who live with you? Select all that apply.    A child under the age of 5   Not selected:    An adult 65 or older    A person who is pregnant    A person who has given birth, had a miscarriage, had a pregnancy loss, or had an  in the last 2 weeks    An  or Native [place]    None of the above   Does any member of your household have any of these medical conditions? Select all that apply.    None of the above   Not selected:    Asthma    Disorders of the brain, spinal cord, or nerves and muscles, such as dementia, cerebral palsy,  epilepsy, muscular dystrophy, or developmental delay    Chronic lung disease, such as COPD or cystic fibrosis    Heart disease, such as congenital heart disease, congestive heart failure, or coronary artery disease    Cerebrovascular disease, such as stroke or another condition affecting the blood vessels or blood supply to the brain    Blood disorders, such as sickle cell disease    Diabetes    Metabolic disorders such as inherited metabolic disorders or mitochondrial disease    Kidney disorders    Liver disorders    Weakened immune system due to illness or medications such as chemotherapy or steroids    Children under the age of 19 who are on long-term aspirin therapy    Extreme obesity (BMI > 40)   Do you have any of these conditions? Scroll to see all options. Select all that apply.    None of the above   Not selected:    Aspirin triad (also known as Samter's triad or ASA triad)    Asthma or hives from taking aspirin or other NSAIDs, such as ibuprofen or naproxen    Blockage or narrowing of the blood vessels of the heart    Blood dyscrasia, such anemia, leukemia, lymphoma, or myeloma    Bone marrow depression    Catecholamine-releasing paraganglioma    Blood clotting disorder    Congenital long QT syndrome    Depression    Difficulty urinating or completely emptying your bladder    Uncorrected electrolyte abnormalities    Fungal infection    Gastrointestinal (GI) bleeding    Gastrointestinal (GI) obstruction    G6PD deficiency    Recent heart attack    High blood pressure    Irregular heartbeat or heart rhythm    Mononucleosis (mono)    Myasthenia gravis    Parkinson's disease    Pheochromocytoma    Reye syndrome    Seizure disorder    Ulcerative colitis   Have you ever had either of these conditions? Select all that apply.    No   Not selected:    Metoclopramide-associated dystonic reaction    Tardive dyskinesia   Just a few more questions about medications, and then you're finished. Have you used any  non-prescription medications or nasal sprays for your current symptoms? Examples include saline sprays, decongestants, NyQuil, and Tylenol. Select one.    No   Not selected:    Yes   Have you taken any monoamine oxidase inhibitor (MAOI) medications in the last 14 days? Examples include rasagiline (Azilect), selegiline (Eldepryl, Zelapar), isocarboxazid (Marplan), phenelzine (Nardil), and tranylcypromine (Parnate). Select one.    No, not that I know of   Not selected:    Yes   Do you take Kynmobi or Apokyn (apomorphine)? Select one.    No   Not selected:    Yes   Are you taking any other medications, vitamins, or supplements? Select one.    No   Not selected:    Yes   Have you ever had an allergic or bad reaction to any medication? Select one.    No   Not selected:    Yes   Are you allergic to milk or to the proteins found in milk (for example, whey or casein)? A milk allergy is different from lactose intolerance. Select one.    No, not that I know of   Not selected:    Yes   Have you ever had jaundice or liver problems as a result of taking amoxicillin-clavulanate (Augmentin)? Jaundice is a condition in which the skin and the whites of the eyes turn yellow. Select all that apply.    No, not that I know of   Not selected:    Yes, jaundice    Yes, liver problems   Have you ever had jaundice or liver problems as a result of taking azithromycin (Zithromax, Zmax)? Jaundice is a condition in which the skin and the whites of the eyes turn yellow. Select all that apply.    No, not that I know of   Not selected:    Yes, jaundice    Yes, liver problems   Do you need a doctor's note? A doctor's note confirms that you received care today and states when you can return to school or work. It does not contain information about your diagnosis or treatment plan. Your provider will make the final decision on whether to give you a doctor's note and for how long. Doctor's notes CANNOT be backdated. We can't provide medical leave  paperwork through this type of visit. If more paperwork is needed to request time off, contact your primary care provider. Select one.    No   Not selected:    Today only (1 day)    Today and tomorrow (2 days)    3 days    5 days    7 days    10 days    14 days   Is there anything else you'd like to tell us about your symptoms?   The patient did not enter any additional information.   ----------   Medical history   Medical history data does not currently exist for this patient.

## 2022-12-14 ENCOUNTER — E-VISIT (OUTPATIENT)
Dept: FAMILY MEDICINE CLINIC | Facility: TELEHEALTH | Age: 32
End: 2022-12-14
Payer: COMMERCIAL

## 2022-12-14 PROCEDURE — BRIGHTMDVISIT: Performed by: NURSE PRACTITIONER

## 2022-12-14 NOTE — E-VISIT TREATED
Chief Complaint: Coronavirus (COVID-19), cold, sinus pain, allergy, or flu   Patient introduction   Patient is 32-year-old male with cough, fever (which may have resolved; see below), sore throat, headache, sweats, chills, myalgia, and fatigue that started less than 48 hours ago. Regarding date of symptom onset, patient writes: 12/13/222.   COVID-19 exposure, testing history, and vaccination status:    No known exposure to a person with a confirmed or suspected case of COVID-19.    No recent travel outside of their local community.    Patient had a self-test within the last week. Patient specifies date of test as 12/14/22. Test result was negative.    Received 2 doses of the COVID-19 vaccine (Moderna, Moderna).   Received their most recent dose of the vaccine more than 14 days ago.   Risk factors for severe disease from COVID-19 infection:    BMI >= 25.   Warning. The following may warrant further investigation:    Headache described as severe (7 to 9 on a scale of 1 to 10).    Dizziness that makes it hard to stand, walk, or do daily activities.   Patient-submitted comments: Seems like flu is likely. Daughter and wife had flu in past week.   Patient requests a 2-day excuse note.   General presentation   Symptoms came on suddenly.   Fever:    Has had less than 24 hours of measured fever.    Has current measured fever, but none at initial symptom onset.    Highest temperature of 101.6F to 101.9F.   Sinus and nasal symptoms:    No nasal or sinus congestion.    No nasal discharge.    No itchy nose or sneezing.   Throat symptoms:    Sore throat.    No known recent strep exposure.    Patient does not think they have strep.    Patient can swallow liquids and solid foods with ease.    No muffled voice.   Head and body aches:    Headache described as severe (7 to 9 on a scale of 1 to 10).    Sweats.    Chills.    Myalgia.    Fatigue.   Cough:    Cough is worse at night/while sleeping.    Cough is mildly productive of  sputum.    Describes color of sputum as white/frothy.   Wheezing and shortness of breath:    No COPD diagnosis.    No asthma diagnosis.    No wheezing.    No shortness of breath.    No previous albuterol inhaler use for URIs, bronchitis, or pneumonia.    No previous steroid inhaler use for URIs, bronchitis, or pneumonia.   Chest pain:    No chest pain.   Ear symptoms:    None.   Dizziness:    Dizziness that interferes with daily activities.   Allergies:    No history of allergies.   Flu exposure:    Recent household exposure to a person with a confirmed flu diagnosis.    Has not had a flu vaccine this season.   Patient is taking over-the-counter medications for current symptoms, including acetaminophen, cetirizine, dextromethorphan, and fluticasone.   Review of red flags/alarm symptoms:    No changes in alertness or awareness.    No symptoms suggesting airway obstruction.    No muffled voice.    No symptoms suggesting intracranial hemorrhage.    No fever above 100.4F lasting longer than 4 days.    No decreased urination.   Risk factors for antibiotic resistance:    Close contact with a child in .    Antibiotic use for similar symptoms within the last 30 days.   Self-exam:    Height: 180 centimeters    Weight: 90.7 kilograms    No difficulty moving their chin toward their chest.    Tonsillar edema.    Tonsils appear normal.    No palatal petechiae.    Able to open mouth normally.    Neck lymph nodes feel normal.    Has taken antibiotics for similar symptoms within the past month.   Current medications   Not taking other medications or supplements.   Medication allergies   None.   Medication contraindication review   No history of anaphylactic reaction to beta-lactam antibiotics; aspirin triad; blood dyscrasia; bone marrow depression; catecholamine-releasing paraganglioma; coronary artery disease; coagulation disorder; congenital long QT syndrome; depression; electrolyte abnormalities; fungal infection; GI  bleeding; GI obstruction; G6PD deficiency; heart arrhythmia; hypertension; mononucleosis; myasthenia; recent myocardial infarction; NSAID-induced asthma/urticaria; Parkinson's disease; pheochromocytoma; porphyria; Reye syndrome; seizure disorder; ulcerative colitis; and urinary retention.   No history of metoclopramide-associated dystonic reaction and tardive dyskinesia.   No known history of amoxicillin-clavulanate-associated cholestatic jaundice or hepatic impairment.   No known history of azithromycin-associated cholestatic jaundice or hepatic impairment.   Past medical history   Immune conditions: No immunocompromising conditions. No history of cancer.   Social history   High-risk household contacts: Patient's household includes one or more members of a group with risk factors for influenza complications, including a child younger than 5 years.   Never smoked tobacco.   Assessment   Influenza.   This is the likely diagnosis based on patient's interview responses and symptoms, including:    Sweats, chills, myalgia, fatigue, and/or headache    Sudden onset of symptoms   Other indications for antiviral treatment:    Symptom onset less than 48 hours ago.    Household contact under the age of 5.   Plan   Medications:    benzonatate 200 mg capsule RX 200mg 1 cap PO tid PRN 7d for cough. Do not chew or cut these capsules. Amount is 21 cap.    Mucus Relief  mg tablet, extended release RX 600mg 1 tab PO q12h PRN 7d for cough and congestion. Amount is 14 tab.    ibuprofen 800 mg tablet RX 800mg 1 tab PO q8h PRN 10d for any fever, pain, or discomfort associated with your condition. Do not exceed 3200mg (4 tablets) in a 24-hour period. Amount is 30 tab.    oseltamivir 75 mg capsule RX 75mg 1 cap PO bid 5d for influenza (flu). Take it exactly as directed. You must finish the entire course of medication, even if you feel better after the first few days of treatment. Amount is 10 cap.   The patient's prescriptions will  be sent to:   [facility]   [address] [place] [facility] [place] [address]   Phone: [phone]     Fax: [phone]   Other:   Patient was given an excuse note for 2 days.   Education:    Condition and causes    Prevention    Treatment and self-care    When to call provider   ----------   Electronically signed by FRANSISCA King on 2022-12-14 at 09:33AM   ----------   Patient Interview Transcript:   Please carefully consider each question and answer as best you can. This helps your provider give you the best care. Which of these symptoms are bothering you? Select all that apply.    Cough    Fever    Sore throat    Headache    Sweats    Chills    Muscle or body aches    Fatigue or tiredness   Not selected:    Shortness of breath    Stuffed-up nose or sinuses    Runny nose    Itchy or watery eyes    Itchy nose or sneezing    Loss of smell or taste    Hoarse voice or loss of voice    Nausea or vomiting    Diarrhea    I don't have any of these symptoms   Since your current symptoms started, have you been tested for COVID-19? Select one.    Yes   Not selected:    No   When was your most recent COVID-19 test? Select one.    Within the last week (specify date as MM/DD/YY): 12/14/22   Not selected:    7 to 14 days ago    15 to 30 days ago    More than 1 month ago   What type of COVID-19 test did you most recently have? There are two types of COVID-19 tests: - Viral tests check if you're currently infected with COVID-19. For these tests, a nose swab or saliva sample is taken. Viral tests include self-tests and tests done at a doctor's office, lab, or testing site. - Antibody tests check if you've been infected in the past. For these tests, your blood is drawn. Antibody tests can only be done at a doctor's office, lab, or testing site. Select one.    Viral self-test   Not selected:    Viral test at a doctor's office, lab, or testing site    Antibody test   What was the result of your most recent COVID-19  "test? Select one.    Negative   Not selected:    Positive    I'm not sure   Have you gotten the COVID-19 vaccine? Select one.    Yes   Not selected:    No   How many total doses of the COVID-19 vaccine have you gotten? This includes boosters as well as additional doses for those who are immunocompromised. Select one.    2 doses   Not selected:    1 dose    3 doses    4 doses    5 doses   1st dose    Moderna   Not selected:    [employer]    Novavax    Pfizer   2nd dose    Moderna   Not selected:    [employer]    Novavax    Pfizer   When did you get your most recent dose of the COVID-19 vaccine?    More than 14 days ago   Not selected:    Less than 48 hours (2 days) ago    48 to 72 hours (3 days) ago    3 to 5 days ago    5 to 7 days ago    7 to 14 days ago   In the last 14 days, have you traveled outside of your local community? This includes travel by car, RV, bus, train, or plane. Travel increases your chances of getting and spreading COVID-19. Select one.    No   Not selected:    Yes   In the last 14 days, have you had close contact with someone who has coronavirus (COVID-19)? \"Close contact\" means any of these: - Living in the same household as someone with COVID-19. - Caring for someone with COVID-19. - Being within 6 feet of someone with COVID-19 for a total of at least 15 minutes over a 24-hour period. For example, three 5-minute exposures for a total of 15 minutes. - Being in direct contact with respiratory droplets from someone with COVID-19 (being coughed on, kissing, sharing utensils). Select one.    No, not that I know of   Not selected:    Yes, a confirmed case    Yes, a suspected case   When did your current symptoms start? Select one.    Less than 48 hours ago   Not selected:    3 to 5 days ago    6 to 9 days ago    10 to 14 days ago    2 to 3 weeks ago    3 to 4 weeks ago    More than a month ago   Do you know the exact date your symptoms started? If so, enter the date as MM/DD/YY. Select one.   "  Yes (specify): 12/13/222   Not selected:    No   Did your symptoms come on suddenly or gradually? Select one.    Suddenly   Not selected:    Gradually    I'm not sure   You mentioned having a fever. Do you have a fever now? Select one.    Yes, but I didn't have one when my symptoms started   Not selected:    Yes, and I've had one since my symptoms started    No, it's gone now   Did you take your temperature with a thermometer? Select one.    Yes   Not selected:    No, but it felt mild    No, but it felt high   What was the highest reading on the thermometer? Select one.    101.6 to 101.9F   Not selected:    Below 100.4F    100.4 to 101.5F    102.0 to 103.0F    Above 103.0F   How long have you had a fever? Select one.    Less than 24 hours   Not selected:    1 to 3 days    4 or more days   You mentioned having a headache. On a scale of 1 to 10, how severe is your headache pain? Select one.    Severe (7 to 9)   Not selected:    Mild (1 to 3)    Moderate (4 to 6)    Unbearable (10)    The worst headache of my life (10+)   Do you cough so hard that it's made you gag or vomit? By gag, we mean has your coughing made you choke or dry heave? Select all that apply.    Yes, my coughing has made me gag   Not selected:    Yes, my coughing has made me vomit    No   When is your cough the worst? Select all that apply.    At nighttime, or while I'm sleeping   Not selected:    In the morning, or when I wake up    During the day    I haven't noticed a difference depending on time of day   Are you coughing up mucus or phlegm? Select one.    Yes, a little   Not selected:    No, my cough is dry    Yes, a lot   What color is most of the mucus or phlegm that you're coughing up? Select one.    White/frothy   Not selected:    Clear    Yellow    Green    Red or pink    I'm not sure   Can you swallow liquids and solid foods? A sore throat may be painful when swallowing, but it shouldn't prevent you from swallowing. Select one.    Yes, with  "ease   Not selected:    Yes, but it's uncomfortable    Yes, but it's painful    It's hard to swallow anything because it feels like liquids and food get stuck in my throat    No, I can't swallow anything, liquid or solid foods   Since your symptoms started, have you felt dizzy? Select one.    Yes, and it makes it hard to stand, walk, or do daily activities   Not selected:    Yes, but I can continue with my regular daily activities    No   Do you have chest pain? You might also feel it as discomfort, aching, tightness, or squeezing in the chest. Select one.    No   Not selected:    Yes   Have you urinated at least 3 times in the last 24 hours? Select one.    Yes   Not selected:    No   Changes in alertness or awareness may mean you need emergency care. Since your symptoms started, have you had any of these? Select all that apply.    None of the above   Not selected:    Confusion    Slurred speech    Not knowing where you are or what day it is    Difficulty staying conscious    Fainting or passing out   Do your symptoms include a whistling sound, or wheezing, when you breathe? Select one.    No   Not selected:    Yes    I'm not sure   Do you have any of these symptoms in your ear(s)? Select all that apply.    None of the above   Not selected:    Pain    Pressure    Fullness    Crackling or popping    Plugged or blocked sensation   Can you move your chin toward your chest?    Yes   Not selected:    No, my neck is too stiff   Try opening your mouth as wide as you can. Are you able to open your mouth all the way as you normally would? Select one.    Yes   Not selected:    No   Does your voice sound muffled? \"Muffled\" here does not mean hoarse or scratchy. By \"muffled,\" we mean that it sounds like you're speaking with a mouth full of hot food. Select one.    No, not that I can tell   Not selected:    Yes   Are your tonsils larger than usual?    Yes   Not selected:    No, not that I can tell    I've had my tonsils " removed   Is there any white or yellow pus on your tonsils?    No, not that I can see   Not selected:    Yes   Are there red spots on the roof of your mouth or the back of your throat?    No, not that I can see   Not selected:    Yes   Are your glands/lymph nodes swollen, or does it hurt when you touch them?    No, not that I can tell   Not selected:    Yes   In the past 2 weeks, has anyone around you (such as at school, work, or home) had a confirmed diagnosis of strep throat? A confirmed diagnosis means that a throat swab and lab test were done to verify a strep throat infection. Select one.    No, not that I know of   Not selected:    Yes   Do you think you might have strep throat? Select one.    No   Not selected:    Yes   In the past week, has anyone around you (such as at school, work, or home) had a confirmed diagnosis of the flu? A confirmed diagnosis means that a nose swab was done to verify a flu infection. Select all that apply.    I live with someone who has the flu   Not selected:    I've been within touching distance of someone who has the flu    I've walked by, or sat about 3 feet away from, someone who has the flu    I've been in the same building as someone who has the flu    No, not that I know of   Have you ever been diagnosed with asthma? Select one.    No   Not selected:    Yes   Have you ever been prescribed albuterol to use for wheezing, cough, or shortness of breath caused by a cold, bronchitis, or pneumonia? Albuterol (ProAir, Proventil, Ventolin) is prescribed as an inhaler or a solution to be used with a nebulizer machine. Select one.    No, not that I know of   Not selected:    Yes   Have you ever been prescribed a steroid inhaler to use for wheezing, cough, or shortness of breath caused by a cold, bronchitis, or pneumonia? Some examples of steroid inhalers include Pulmicort, Flovent, Qvar, and Alvesco. Select one.    No, not that I know of   Not selected:    Yes   Have you ever been  diagnosed with chronic obstructive pulmonary disease (COPD)? Select one.    No, not that I know of   Not selected:    Yes   In the past month, have you taken antibiotics for similar symptoms? Examples of antibiotics include amoxicillin, amoxicillin-clavulanate (Augmentin), penicillin, cefdinir (Omnicef), doxycycline, and clindamycin (Cleocin). Select one.    Yes   Not selected:    No   Do you have allergies (pollen, dust mites, mold, animal dander)? Select one.    No, not that I know of   Not selected:    Yes   Have you had a flu shot this season? Select one.    No, not that I know of   Not selected:    Yes, less than 2 weeks ago    Yes, 2 to 4 weeks ago    Yes, 1 to 3 months ago    Yes, 3 to 6 months ago    Yes, more than 6 months ago   The flu and COVID-19 can be more serious for people with certain conditions or characteristics. These questions help us figure out if you or anyone you live with is at higher risk for complications from these infections. Do either of these statements apply to you? Select all that apply.    None of the above   Not selected:    I'm  or Native [place]    I'm a healthcare worker   Do you smoke tobacco? Select one.    No   Not selected:    Yes, every day    Yes, some days    No, I quit   Do you have any of these conditions? Select all that apply.    None of the above   Not selected:    Chronic lung disease, such as cystic fibrosis or interstitial fibrosis    Heart disease, such as congenital heart disease, congestive heart failure, or coronary artery disease    Disorder of the brain, spinal cord, or nerves and muscles, such as dementia, cerebral palsy, epilepsy, muscular dystrophy, or developmental delay    Metabolic disorder or mitochondrial disease    Cerebrovascular disease, such as stroke or another condition affecting the blood vessels or blood supply to the brain    Down syndrome    Mood disorder, including depression or schizophrenia spectrum disorders    Substance  use disorder, such as alcohol, opioid, or cocaine use disorder    Tuberculosis   Do you live in a group care setting? Examples include: - Nursing home - Residential care - Psychiatric treatment facility - Group home - Dormitory - Board and care home - Homeless shelter - Foster care setting Select one.    No   Not selected:    Yes   Are you a healthcare worker? Select one.    No   Not selected:    Yes   People with a very high body mass index (BMI) are at higher risk for developing complications from the flu and severe illness from COVID-19. To determine your BMI, we need to know your weight and height. Please enter your weight (in pounds).    Weight   Please enter your height.    Height   Do you have any of these conditions that can affect the immune system? Scroll to see all options. Select all that apply.    None of these   Not selected:    History of bone marrow transplant    Chronic kidney disease    Chronic liver disease (including cirrhosis)    HIV/AIDS    Inflammatory bowel disease (Crohn's disease or ulcerative colitis)    Lupus    Moderate to severe plaque psoriasis    Multiple sclerosis    Rheumatoid arthritis    Sickle cell anemia    Alpha or beta thalassemia    History of solid organ transplant (kidney, liver, or heart)    History of spleen removal    An autoimmune disorder not listed here    A condition requiring treatment with long-term use of oral steroids (such as prednisone, prednisolone, or dexamethasone)   Have you ever been diagnosed with cancer? Select one.    No   Not selected:    Yes, I have cancer now    Yes, but I'm in remission   Do any of these apply to you? Select all that apply.    I'm in close contact with a child in    Not selected:    I've been hospitalized within the last 5 days    I have diabetes    None of the above   Do any of these apply to the people who live with you? Select all that apply.    A child under the age of 5   Not selected:    An adult 65 or older    A person  who is pregnant    A person who has given birth, had a miscarriage, had a pregnancy loss, or had an  in the last 2 weeks    An  or Native [place]    None of the above   Does any member of your household have any of these medical conditions? Select all that apply.    None of the above   Not selected:    Asthma    Disorders of the brain, spinal cord, or nerves and muscles, such as dementia, cerebral palsy, epilepsy, muscular dystrophy, or developmental delay    Chronic lung disease, such as COPD or cystic fibrosis    Heart disease, such as congenital heart disease, congestive heart failure, or coronary artery disease    Cerebrovascular disease, such as stroke or another condition affecting the blood vessels or blood supply to the brain    Blood disorders, such as sickle cell disease    Diabetes    Metabolic disorders such as inherited metabolic disorders or mitochondrial disease    Kidney disorders    Liver disorders    Weakened immune system due to illness or medications such as chemotherapy or steroids    Children under the age of 19 who are on long-term aspirin therapy    Extreme obesity (BMI > 40)   Do you have any of these conditions? Scroll to see all options. Select all that apply.    None of the above   Not selected:    Aspirin triad (also known as Samter's triad or ASA triad)    Asthma or hives from taking aspirin or other NSAIDs, such as ibuprofen or naproxen    Blockage or narrowing of the blood vessels of the heart    Blood dyscrasia, such anemia, leukemia, lymphoma, or myeloma    Bone marrow depression    Catecholamine-releasing paraganglioma    Blood clotting disorder    Congenital long QT syndrome    Depression    Difficulty urinating or completely emptying your bladder    Uncorrected electrolyte abnormalities    Fungal infection    Gastrointestinal (GI) bleeding    Gastrointestinal (GI) obstruction    G6PD deficiency    Recent heart attack    High blood pressure    Irregular  heartbeat or heart rhythm    Mononucleosis (mono)    Myasthenia gravis    Parkinson's disease    Pheochromocytoma    Reye syndrome    Seizure disorder    Ulcerative colitis   Have you ever had either of these conditions? Select all that apply.    No   Not selected:    Metoclopramide-associated dystonic reaction    Tardive dyskinesia   Just a few more questions about medications, and then you're finished. Have you used any non-prescription medications or nasal sprays for your current symptoms? Examples include saline sprays, decongestants, NyQuil, and Tylenol. Select one.    Yes   Not selected:    No   Which of these non-prescription medications have you tried? Scroll to see all options. Select all that apply.    Acetaminophen (Tylenol)    Cetirizine (Zyrtec)    Dextromethorphan (Delsym, Robitussin, Vicks DayQuil Cough)    Fluticasone (Flonase)   Not selected:    Budesonide (Rhinocort)    Chlorpheniramine (Aller-chlor, Chlor-Trimeton)    Cromolyn (NasalCrom)    Diphenhydramine (Benadryl)    Fexofenadine (Allegra)    Guaifenesin (Mucinex)    Guaifenesin/dextromethorphan (Delsym DM, Mucinex DM, Robitussin DM)    Ibuprofen (Advil, Motrin, Midol)    Ketotifen (Alaway, Zaditor)    Loratadine (Alavert, Claritin)    Naphazoline-pheniramine (Naphcon-A, Opcon-A, Visine-A)    Omeprazole (Prilosec)    Oxymetazoline (Afrin)    Phenylephrine (Sudafed)    Triamcinolone (Nasacort)    None of the above   Have you taken any monoamine oxidase inhibitor (MAOI) medications in the last 14 days? Examples include rasagiline (Azilect), selegiline (Eldepryl, Zelapar), isocarboxazid (Marplan), phenelzine (Nardil), and tranylcypromine (Parnate). Select one.    No, not that I know of   Not selected:    Yes   Do you take Kynmobi or Apokyn (apomorphine)? Select one.    No   Not selected:    Yes   Are you taking any other medications, vitamins, or supplements? Select one.    No   Not selected:    Yes   Have you ever had an allergic or bad reaction  to any medication? Select one.    No   Not selected:    Yes   Are you allergic to milk or to the proteins found in milk (for example, whey or casein)? A milk allergy is different from lactose intolerance. Select one.    No, not that I know of   Not selected:    Yes   Have you ever had jaundice or liver problems as a result of taking amoxicillin-clavulanate (Augmentin)? Jaundice is a condition in which the skin and the whites of the eyes turn yellow. Select all that apply.    No, not that I know of   Not selected:    Yes, jaundice    Yes, liver problems   Have you ever had jaundice or liver problems as a result of taking azithromycin (Zithromax, Zmax)? Jaundice is a condition in which the skin and the whites of the eyes turn yellow. Select all that apply.    No, not that I know of   Not selected:    Yes, jaundice    Yes, liver problems   Do you need a doctor's note? A doctor's note confirms that you received care today and states when you can return to school or work. It does not contain information about your diagnosis or treatment plan. Your provider will make the final decision on whether to give you a doctor's note and for how long. Doctor's notes CANNOT be backdated. We can't provide medical leave paperwork through this type of visit. If more paperwork is needed to request time off, contact your primary care provider. Select one.    Today and tomorrow (2 days)   Not selected:    Today only (1 day)    3 days    5 days    7 days    10 days    14 days    No   Is there anything else you'd like to tell us about your symptoms?    Seems like flu is likely. Daughter and wife had flu in past week   ----------   Medical history   The following information was received from the EMR on December 14, 2022.   Allergies:    No Known Allergies   - Allergy Type:   - Reaction:   - Severity:   - Clinical Status: Active   - Verification Status: Confirmed   Medications:    multivitamin (THERAGRAN) tablet   - Route: Oral   - Start  Date: September 17, 2021   - End Date: None   - Status: Active   Problem list:    Palpitations   - Category: Problem List Item   - Health Status:   - Start Date: September 17, 2021   - End Date: None   - Status: Active    Elevated blood-pressure reading without diagnosis of hypertension   - Category: Problem List Item   - Health Status:   - Start Date: September 17, 2021   - End Date: March 16, 2022   - Status: Resolved    Pharyngitis   - Category: Problem List Item   - Health Status:   - Start Date: March 16, 2022   - End Date: None   - Status: Active    Allergic rhinitis   - Category: Problem List Item   - Health Status:   - Start Date: March 16, 2022   - End Date: None   - Status: Active

## 2022-12-14 NOTE — EXTERNAL PATIENT INSTRUCTIONS
View Doctor's Note     Diagnosis   Influenza (the flu)   My name is Janelle Bonilla, and I'm a healthcare provider at [facility]. After reviewing your interview, I see that you may have influenza, or the flu.   With the ongoing COVID-19 pandemic, it can be hard to tell the difference between the flu and a COVID-19 infection. The two illnesses share many of the same symptoms, including fever, fatigue, muscle and/or body aches, and a cough. Most people with either illness have mild to moderate symptoms and can rest at home until they get better.   To prevent the spread of illness to others, I recommend that you stay home and away from other people as much as possible while you're sick. When you need to be around other people, consider wearing a face mask.   Here are the main things to know about your current symptoms:    You can use the medications recommended here to help ease your symptoms.   I haven't prescribed any antibiotics. Antibiotics fight bacteria, not the influenza virus. The following factors suggest that influenza, not a bacterial infection, is causing your symptoms:    Symptoms such as fatigue, sweats, chills, and aches are common with viral infections like the flu.    Your symptoms began suddenly.   I've given you a doctor's note for 2 days. Contact us if you need a longer period of time in your doctor's note.   Medications   Your pharmacy   [facility] [address] [facility] [address] [phone]     Prescription   Benzonatate (200mg): Take 1 capsule by mouth 3 times a day as needed for cough. Do not chew or cut these capsules.   Mucus Relief ER oral tablet, extended release (600mg): Take 1 tablet by mouth every 12 hours as needed for cough and congestion.   Ibuprofen (800mg): Take 1 tablet by mouth every 6 to 8 hours as needed for up to 10 days for any fever, pain, or discomfort associated with your condition. Do not exceed 3200mg (4 tablets) in a 24-hour period.    Oseltamivir (75mg): Take 1 capsule by mouth twice a day for 5 days for influenza (flu). Take it exactly as directed. You must finish the entire course of medication, even if you feel better after the first few days of treatment.    Start taking this antiviral medication as soon as possible. The medication works best when started within 48 hours of getting sick. It won't get rid of your flu symptoms, but it will lessen the severity of your symptoms, shorten your illness by 1 to 2 days, and prevent serious complications.    The most common side effects of Tamiflu are nausea and vomiting. To lessen these side effects, take the medication with food. Tamiflu may rarely cause more serious side effects, such as behavior changes and delirium. If you have these serious side effects, stop taking the medication and speak to a provider immediately.    I've given you a prescription dose of ibuprofen. If it's more affordable or convenient, you may use the equivalent amount of non-prescription ibuprofen. For example, instead of taking one 800 mg ibuprofen tablet, you may take four 200 mg ibuprofen tablets.   Medications won't cure the flu. However, they may help you feel better while your immune system fights the virus.   About your diagnosis   The flu is a contagious respiratory illness caused by influenza viruses that infect the nose, throat, and lungs. Although more than 200 different viruses can cause the common cold, there aren't as many that cause the flu. That's why there is a vaccine against the flu but not against the common cold. These are the key things to know about the flu:    Flu viruses spread mainly by droplets released into the air. When people with the flu cough, sneeze, or talk, these droplets can land in the mouths or noses of people nearby.    People can also get the flu by touching an object that has flu virus on it and then touching their own mouth, eyes, or nose.   What to expect   Follow the advice in the  treatment section below and you should feel better within 3 to 7 days. You may continue to feel tired and have a cough for several weeks.   You can spread the flu before you know you're sick, even a full day before symptoms develop. Most adults can infect others up to a week after first becoming sick.   You can return to your normal activities when ALL of the following are true:    You've been fever-free for more than 24 hours without using fever-reducing medications such as Tylenol    Your other symptoms have improved    It's been at least 5 full days since your symptoms first started   When to seek care   Call us at [phone]   with any sudden or unexpected symptoms.    Fever that measures over 103F or continues for more than 3 days.    Bluish color to your lips.    Coughing up red or bloody mucus.    Severe chest pain.    Severe shortness of breath.    Sudden dizziness.    Confusion.    More than 5 episodes of vomiting in a day.    Convulsions or seizures.    Swallowing becomes extremely difficult or impossible.    Sinus pain that lasts for more than 10 days, without improvement.    Symptoms that get better for a few days and then return with fever and worse cough.   Other treatment    Rest and drink plenty of sugar-free fluids.    Gargle with salt water several times a day to help your throat feel better. Cough drops and throat lozenges may provide extra relief. A teaspoon of honey stirred into warm water or weak tea can help soothe a sore throat and cough.    If your nose or sinuses become very stuffy, try using a Neti Pot to flush them out. Neti Pots are available at any drugstore without a prescription.    Avoid smoke and air pollution. Smoke can make infections worse.   Prevention   The flu is very contagious and can be more dangerous for people with high-risk conditions. Because you've been diagnosed with the flu, people who live with you or are in close contact with you may need medication to  protect them from the flu.   The following people are at high-risk for complications from the flu:    Children under the age of 5    Adults over the age of 65    Women who are pregnant or have given birth within the last 2 weeks    Residents of nursing homes or long-term care facilities    Native Americans, including [place] Natives    People with the following medical conditions:    Asthma    Disorders of the brain, spinal cord, or nerves and muscles such as cerebral palsy, stroke, epilepsy, muscular dystrophy or developmental delay    Chronic lung disease such as COPD or cystic fibrosis    Heart disease such as congenital heart disease, congestive heart failure or coronary artery disease    Blood disorders such as sickle cell disease    Diabetes    Metabolic disorders such as inherited metabolic disorders or mitochondrial disease    Kidney disorders    Liver disorders    A weakened immune system due to illness or medications such as chemotherapy or steroids    People under the age of 19 who are on long-term aspirin therapy    Extreme obesity (BMI > 40)   If any of your close contacts fall into one of the categories above, they should speak with their healthcare provider as soon as possible. They may need to take antiviral medications to prevent the flu.   Avoid spreading the flu to others while you're sick:    Stay home and avoid contact with others.    Cover your mouth and nose with a tissue when you cough or sneeze. Put your used tissues in a waste basket immediately. If you don't have a tissue, cough or sneeze into your upper sleeve or elbow, not into your hands.    Wash your hands often with soap and water for at least 20 seconds. If soap and water aren't available, use an alcohol-based hand .    Regularly clean and disinfect surfaces and objects.   To prevent severe illness and serious complications from the flu:    Everyone 6 months of age and older should get a yearly flu vaccine.    Good hand  hygiene is important! Wash your hands regularly, especially after using the bathroom, changing a diaper, blowing your nose, coughing or sneezing, and before eating.    Avoid close contact with people who are sick.    Coronavirus (COVID-19) information   Common symptoms of COVID-19 include fever, cough, shortness of breath, fatigue, muscle or body aches, headaches, new loss of sense of taste or smell, sore throat, stuffy or runny nose, nausea or vomiting, and diarrhea. Most people who get COVID-19 have mild symptoms and can rest at home until they get better. Elderly people and those with chronic medical problems may be at risk for more serious complications.   FAQs about the COVID-19 vaccine   There are four authorized COVID-19 vaccines: Efrain Utrip's Kristine Vaccine (J&J/Kristine), Moderna, Novavax, and Pfizer-BioNTech (Pfizer). The J&J/Kristine and Novavax vaccines are approved for use in people aged 18 and older. The Moderna and Pfizer vaccines are approved for those aged 6 months and older. All four are available at no cost. Even if you don't have health insurance, you can still get the COVID-19 vaccine for free.   Which vaccine is the best? Which vaccine should I get?   All four vaccines are highly effective. Even if you get COVID-19 after being vaccinated, all of the vaccines help prevent severe disease, hospitalization, and complications.   Most people should get whichever vaccine is first available to them. However, women younger than 50 years old should consider the rare risk of blood clots with low platelets after vaccination with the J&J/Kristine vaccine. This risk hasn't been seen with the other three vaccines.   Are the vaccines safe?   Yes. Hundreds of millions of people in the [place] have already safely received COVID-19 vaccines under the most intense safety monitoring in the history of the [place].   Do I need the vaccine if I've already had COVID?   Yes. Vaccination helps protect you even if you've  already had COVID.   If you had COVID-19 and had symptoms, wait to get vaccinated until you've recovered and completed your isolation period.   If you tested positive for COVID-19 but did not have symptoms, you can get vaccinated after 5 full days have passed since you had a positive test, as long as you don't develop symptoms.   How many doses of the vaccine do I need?   Visit www.cdc.gov/coronavirus/2019-ncov/vaccines/stay-up-to-date.html   to find out how to stay up to date with your COVID-19 vaccines.   I'm immunocompromised. How many doses of the vaccine do I need?   For information on how immunocompromised people can stay up to date with their COVID-19 vaccines, visit www.cdc.gov/coronavirus/2019-ncov/vaccines/recommendations/immuno.html  .   What are the common side effects of the vaccine?   A sore arm, tiredness, headache, and muscle pain may occur within two days of getting the vaccine and last a day or two. For the Moderna or Pfizer vaccines, side effects are more common after the second dose. People over the age of 55 are less likely to have side effects than younger people.   After I'm up to date on vaccines, can I still get or spread COVID?   Yes, you can still get COVID, but your disease should be milder. And your risk of serious illness, hospitalization, and complications will be much lower, especially if you're up to date. Unfortunately, you can still spread COVID if you've been vaccinated. That's why it's important to follow isolation guidelines if you get sick or test positive.   After I'm up to date on vaccines, can I go back to normal?   You should still wear a mask indoors in public if:    It's required by laws, rules, regulations, or local guidance.    You have a weakened immune system.    Your age puts you at increased risk of severe disease.    You have a medical condition that puts you at increased risk of severe disease.    Someone in your household has a weakened immune system, is at  increased risk for severe disease, or is unvaccinated.    You're in an area of high transmission.   Where can I get a COVID-19 vaccine?   Visit [facility]'s website for more information. To find a COVID-19 vaccination site near you, visit www.vaccines.gov/  , call [phone]  , or text your zip code to 500134 ([place]). Message and data rates may apply.   What about travel?   Travel increases your risk of exposure to COVID-19. For more information, see www.cdc.gov/coronavirus/2019-ncov/travelers/index.html  .   I've had close contact with someone who has COVID. Do I need to quarantine, and if so, for how long?   For the most current answer, including a calculator to determine whether you need to stay home and for how long, visit www.cdc.gov/coronavirus/2019-ncov/your-health/quarantine-isolation.html  .   I've tested positive for COVID. How long do I need to isolate?   For the latest recommendations, including a calculator to determine how long you need to stay home, visit www.cdc.gov/coronavirus/2019-ncov/your-health/quarantine-isolation.html  .   What if I develop symptoms that might be from COVID?   For the latest recommendations on what to do if you're sick, including when to seek emergency care, visit www.cdc.gov/coronavirus/2019-ncov/if-you-are-sick/steps-when-sick.html  .   Your provider   Your diagnosis was provided by Janelle Bonilla, a member of your trusted care team at [facility].   If you have any questions, call us at [phone]  .   View Doctor's Note     Expires on 01/13/23

## 2023-03-02 NOTE — PROGRESS NOTES
Subjective  Answers for HPI/ROS submitted by the patient on 3/3/2023  What is the primary reason for your visit?: High Blood Pressure      Christ Hummel is a 32 y.o. male.     Chief Complaint   Patient presents with   • Hypertension   • Irregular Heart Beat       History of Present Illness   Patient presents with c/o elevated BP and palpitations; pt woke up on 2/25/23 and noted some palpitations; later felt worse, then had some decreased appetite; no fever; had had cough recently and was seen at [facility] and had been taking cough suppressant (Benzonatate) and Stahist, but had not taken for 4-5 days prior to onset of symptoms; was negative for strep; diagnosed with cold/allergies; HR was 100s; checked BP and was 180/102; checked a few more times and DBP stayed in 100s; went to [facility] on 2/25/23 and BP was increased, and sent to ER; in ER, BP was 160/112, had ECG and normal, chest x-ray and labs WNL; was given IVF and felt some better; BP decreased while at ER; recommended follow up with PCP; recommended Holter monitor; no known family history of heart problems; no caffeine since 2/25/23 and still having some palpitations at times throughout the day, not as bad; had had one cup of coffee on 2/25/23; has been monitoring BP, typically runs 150s/90s, HR has been 70-80s; was having some dizziness with symptoms, but not as much in last few days; has had some headaches at times, more in the evenings; does not think snores; no waking up SOA.    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      The following portions of the patient's history were reviewed and updated as appropriate: allergies, current medications, past family history, past medical history, past social history, past surgical history  and problem list.    Current Outpatient Medications on File Prior to Visit   Medication Sig   • multivitamin (THERAGRAN) tablet tablet Take  by mouth Daily.     No current facility-administered medications on file prior to visit.        Past Medical History:   Diagnosis Date   • Elevated blood-pressure reading without diagnosis of hypertension 9/17/2021       Past Surgical History:   Procedure Laterality Date   • NO PAST SURGERIES         Family History   Problem Relation Age of Onset   • Testicular cancer Brother 35   • Anemia Mother    • No Known Problems Father    • Coronary artery disease Paternal Grandmother        Social History     Socioeconomic History   • Marital status:    Tobacco Use   • Smoking status: Never   • Smokeless tobacco: Never   Vaping Use   • Vaping Use: Never used   Substance and Sexual Activity   • Alcohol use: Yes     Alcohol/week: 2.0 - 4.0 standard drinks     Types: 2 - 4 Cans of beer per week     Comment: socially   • Drug use: Never   • Sexual activity: Yes     Partners: Female     Birth control/protection: OCP       Review of Systems   Constitutional: Positive for fatigue. Negative for chills, fever, unexpected weight gain and unexpected weight loss. Appetite change: some decrease recently.   HENT: Negative for ear pain (some pressure in ears in mornings), rhinorrhea, sinus pressure, sore throat and trouble swallowing.    Eyes: Negative for blurred vision.   Respiratory: Negative for chest tightness and shortness of breath (mild with activity recently). Cough: occasional cough since recent cold.    Cardiovascular: Positive for palpitations. Negative for chest pain and leg swelling.   Gastrointestinal: Negative for abdominal pain, blood in stool, constipation, diarrhea, GERD and indigestion.   Endocrine: Negative for cold intolerance, heat intolerance and polydipsia.   Genitourinary: Negative for dysuria and frequency.   Musculoskeletal: Negative for back pain and neck pain.  "  Skin: Negative for rash.   Neurological: Light-headedness: mild with palpitations when going up stairs; resolves with rest; no orthostasis. Headache: see HPI.   Hematological: Does not bruise/bleed easily.   Psychiatric/Behavioral: Negative for depressed mood. Sleep disturbance: has trouble staying asleep, typically gets 4-5 hours of sleep; will have trouble falling back to sleep when wakes up. The patient is not nervous/anxious.        Objective   Vitals:    03/03/23 0739 03/03/23 0816 03/03/23 0817   BP: 118/62 108/82 122/90   BP Location: Left arm Left arm Right arm   Patient Position: Sitting Sitting Sitting   Cuff Size: Adult     Pulse: 94 88    SpO2: 98%     Weight: 90.3 kg (199 lb)     Height: 177.8 cm (70\")       Body mass index is 28.55 kg/m².    Physical Exam  Vitals and nursing note reviewed.   Constitutional:       General: He is not in acute distress.     Appearance: He is well-developed and well-groomed. He is not diaphoretic.   HENT:      Head: Normocephalic.      Right Ear: External ear normal. No decreased hearing noted. Right ear middle ear effusion: mild. Tympanic membrane is not erythematous.      Left Ear: External ear normal. No decreased hearing noted. Left ear middle ear effusion: mild. Left ear injected TM: mild.      Nose: Nose normal.      Right Sinus: No maxillary sinus tenderness or frontal sinus tenderness.      Left Sinus: No maxillary sinus tenderness or frontal sinus tenderness.      Mouth/Throat:      Mouth: Mucous membranes are moist.      Pharynx: No oropharyngeal exudate or posterior oropharyngeal erythema.   Eyes:      Conjunctiva/sclera: Conjunctivae normal.   Neck:      Vascular: No carotid bruit.   Cardiovascular:      Rate and Rhythm: Normal rate and regular rhythm.      Pulses: Normal pulses.      Heart sounds: Normal heart sounds. No murmur heard.  Pulmonary:      Effort: Pulmonary effort is normal. No respiratory distress.      Breath sounds: Normal breath sounds. "   Abdominal:      General: Bowel sounds are normal.      Palpations: Abdomen is soft. There is no hepatomegaly or splenomegaly.      Tenderness: There is abdominal tenderness in the right lower quadrant. There is no guarding or rebound.   Musculoskeletal:      Cervical back: Normal range of motion and neck supple.      Right lower leg: No edema.      Left lower leg: No edema.   Lymphadenopathy:      Cervical: No cervical adenopathy.   Skin:     General: Skin is warm and dry.      Findings: No rash.   Neurological:      Mental Status: He is alert and oriented to person, place, and time.      Gait: Gait is intact.   Psychiatric:         Mood and Affect: Mood normal.         Behavior: Behavior normal.         Thought Content: Thought content normal.         Cognition and Memory: Cognition normal.         Judgment: Judgment normal.         Lab Results   Component Value Date    TSH 1.440 09/17/2021     Records reviewed from [facility] on 2/25/23; pt presented with c/o palpitations; described as a heavy beat that he feels frequently; no SOA or leg swelling; no history of CAD; no syncope; no heavy caffeine use; no new medication; no fever or recent illness; no known sick contacts; ECG normal; was given aspirin and IVF in ER; recommended follow up with PCP and Holter monitor; /112 with  on arrival and /92 with HR 84 at discharge.  2/25/23 CMP WNL; BNP <10; magnesium 2.1; troponin <0.010 x2; TSH 1.240; CBC WNL  2/25/23 chest x-ray: lungs clear; no pleural effusion or pneumothorax; normal cardiac silhouette; no acute bony findings.      Assessment    Problem List Items Addressed This Visit     Palpitations - Primary    Relevant Orders    Holter Monitor - 24 Hour    Comprehensive Metabolic Panel (Completed)    Elevated blood-pressure reading without diagnosis of hypertension    Current Assessment & Plan     Continue to monitor your blood pressure periodically and record results.  Call if your blood pressure is  consistently greater than 140/90.  Bring home blood pressure machine to next office visit to compare readings.         Relevant Orders    Lipid Panel With LDL / HDL Ratio    Right lower quadrant abdominal tenderness without rebound tenderness    Relevant Orders    CBC & Differential (Completed)    Comprehensive Metabolic Panel (Completed)   Other Visit Diagnoses     Encounter for lipid screening for cardiovascular disease        Relevant Orders    Lipid Panel With LDL / HDL Ratio             Return in about 2 weeks (around 3/17/2023) for Annual physical, Recheck.or sooner if symptoms persist or worsen.  Patient has had increased BP per home readings and mildly increased today; will continue to monitor BP and bring home BP machine to next office visit to compare readings; pt had similar symptoms 9/2021 and symptoms resolved after 1-2 weeks; pt has had some improvement in symptoms in last few days, but still having daily palpitations throughout the day; will get Holter monitor for further evaluation; will consider referral to cardiology pending results.  Patient tender in RLQ, but had not noted discomfort in area prior to palpation; will check labs today; ER warnings given.       COVID-19 Precautions - Patient was compliant in wearing a mask. When I saw the patient, I used appropriate personal protective equipment (PPE) including mask, gloves, and eye shield (standard procedure).  Hand hygiene was completed before and after seeing the patient.

## 2023-03-03 ENCOUNTER — OFFICE VISIT (OUTPATIENT)
Dept: FAMILY MEDICINE CLINIC | Facility: CLINIC | Age: 33
End: 2023-03-03
Payer: COMMERCIAL

## 2023-03-03 VITALS
BODY MASS INDEX: 28.49 KG/M2 | HEIGHT: 70 IN | HEART RATE: 88 BPM | WEIGHT: 199 LBS | DIASTOLIC BLOOD PRESSURE: 90 MMHG | OXYGEN SATURATION: 98 % | SYSTOLIC BLOOD PRESSURE: 122 MMHG

## 2023-03-03 DIAGNOSIS — R03.0 ELEVATED BLOOD-PRESSURE READING WITHOUT DIAGNOSIS OF HYPERTENSION: ICD-10-CM

## 2023-03-03 DIAGNOSIS — R10.813 RIGHT LOWER QUADRANT ABDOMINAL TENDERNESS WITHOUT REBOUND TENDERNESS: ICD-10-CM

## 2023-03-03 DIAGNOSIS — Z13.6 ENCOUNTER FOR LIPID SCREENING FOR CARDIOVASCULAR DISEASE: ICD-10-CM

## 2023-03-03 DIAGNOSIS — R00.2 PALPITATIONS: Primary | ICD-10-CM

## 2023-03-03 DIAGNOSIS — Z13.220 ENCOUNTER FOR LIPID SCREENING FOR CARDIOVASCULAR DISEASE: ICD-10-CM

## 2023-03-03 PROCEDURE — 99214 OFFICE O/P EST MOD 30 MIN: CPT | Performed by: NURSE PRACTITIONER

## 2023-03-03 NOTE — PATIENT INSTRUCTIONS
Continue to monitor your blood pressure periodically and record results.  Call if your blood pressure is consistently greater than 140/90.  Bring home blood pressure machine to next office visit to compare readings.  Continue to work on healthy diet and exercise.  Follow up pending lab/test results.  Follow up in 2 weeks, or sooner if symptoms persist or worsen.

## 2023-03-04 PROBLEM — R10.813 RIGHT LOWER QUADRANT ABDOMINAL TENDERNESS WITHOUT REBOUND TENDERNESS: Status: ACTIVE | Noted: 2023-03-04

## 2023-03-04 LAB
ALBUMIN SERPL-MCNC: 5.6 G/DL (ref 3.5–5.2)
ALBUMIN/GLOB SERPL: 2.9 G/DL
ALP SERPL-CCNC: 45 U/L (ref 39–117)
ALT SERPL-CCNC: 24 U/L (ref 1–41)
AST SERPL-CCNC: 16 U/L (ref 1–40)
BASOPHILS # BLD AUTO: 0.02 10*3/MM3 (ref 0–0.2)
BASOPHILS NFR BLD AUTO: 0.3 % (ref 0–1.5)
BILIRUB SERPL-MCNC: 0.9 MG/DL (ref 0–1.2)
BUN SERPL-MCNC: 17 MG/DL (ref 6–20)
BUN/CREAT SERPL: 15.7 (ref 7–25)
CALCIUM SERPL-MCNC: 10.9 MG/DL (ref 8.6–10.5)
CHLORIDE SERPL-SCNC: 101 MMOL/L (ref 98–107)
CHOLEST SERPL-MCNC: 173 MG/DL (ref 0–200)
CO2 SERPL-SCNC: 28.7 MMOL/L (ref 22–29)
CREAT SERPL-MCNC: 1.08 MG/DL (ref 0.76–1.27)
EGFRCR SERPLBLD CKD-EPI 2021: 92.9 ML/MIN/1.73
EOSINOPHIL # BLD AUTO: 0.07 10*3/MM3 (ref 0–0.4)
EOSINOPHIL NFR BLD AUTO: 1.1 % (ref 0.3–6.2)
ERYTHROCYTE [DISTWIDTH] IN BLOOD BY AUTOMATED COUNT: 12.7 % (ref 12.3–15.4)
GLOBULIN SER CALC-MCNC: 1.9 GM/DL
GLUCOSE SERPL-MCNC: 92 MG/DL (ref 65–99)
HCT VFR BLD AUTO: 49.4 % (ref 37.5–51)
HDLC SERPL-MCNC: 56 MG/DL (ref 40–60)
HGB BLD-MCNC: 17.2 G/DL (ref 13–17.7)
IMM GRANULOCYTES # BLD AUTO: 0.02 10*3/MM3 (ref 0–0.05)
IMM GRANULOCYTES NFR BLD AUTO: 0.3 % (ref 0–0.5)
LDLC SERPL CALC-MCNC: 104 MG/DL (ref 0–100)
LDLC/HDLC SERPL: 1.85 {RATIO}
LYMPHOCYTES # BLD AUTO: 1.24 10*3/MM3 (ref 0.7–3.1)
LYMPHOCYTES NFR BLD AUTO: 19.6 % (ref 19.6–45.3)
MCH RBC QN AUTO: 30.7 PG (ref 26.6–33)
MCHC RBC AUTO-ENTMCNC: 34.8 G/DL (ref 31.5–35.7)
MCV RBC AUTO: 88.2 FL (ref 79–97)
MONOCYTES # BLD AUTO: 0.49 10*3/MM3 (ref 0.1–0.9)
MONOCYTES NFR BLD AUTO: 7.7 % (ref 5–12)
NEUTROPHILS # BLD AUTO: 4.49 10*3/MM3 (ref 1.7–7)
NEUTROPHILS NFR BLD AUTO: 71 % (ref 42.7–76)
NRBC BLD AUTO-RTO: 0 /100 WBC (ref 0–0.2)
PLATELET # BLD AUTO: 240 10*3/MM3 (ref 140–450)
POTASSIUM SERPL-SCNC: 4.8 MMOL/L (ref 3.5–5.2)
PROT SERPL-MCNC: 7.5 G/DL (ref 6–8.5)
RBC # BLD AUTO: 5.6 10*6/MM3 (ref 4.14–5.8)
SODIUM SERPL-SCNC: 139 MMOL/L (ref 136–145)
TRIGL SERPL-MCNC: 68 MG/DL (ref 0–150)
VLDLC SERPL CALC-MCNC: 13 MG/DL (ref 5–40)
WBC # BLD AUTO: 6.33 10*3/MM3 (ref 3.4–10.8)

## 2023-03-04 NOTE — ASSESSMENT & PLAN NOTE
Continue to monitor your blood pressure periodically and record results.  Call if your blood pressure is consistently greater than 140/90.  Bring home blood pressure machine to next office visit to compare readings.

## 2023-03-16 NOTE — PROGRESS NOTES
Subjective   Christ Hummel is a 32 y.o. male.     Chief Complaint   Patient presents with   • Hypertension   • Annual Exam       History of Present Illness   Patient presents for CPE with fasting labs; no new problems or concerns today; healthy diet; regular exercise, goes to gym and does strength training, walks dog, etc; regular dental visits; no recent eye exam, no vision correction; no problems hearing; immunizations UTD, declines flu vaccine; colonoscopy never performed; no family history of colon cancer.    F/U elevated blood pressure: monitors BP, has been running 140s/80s instead of 150s/90s; wondering if home BP machine runs a little high; gets headache 3-4 times per week; no orthostasis; no swelling.    F/U palpitations: had recent Holter monitor and normal; overall palpitations have improved; does have lot of stress.    F/U hypercalcemia: no vitamin supplements.    The following portions of the patient's history were reviewed and updated as appropriate: allergies, current medications, past family history, past medical history, past social history, past surgical history and problem list.      No current outpatient medications on file.     No current facility-administered medications for this visit.       Past Medical History:   Diagnosis Date   • Elevated blood-pressure reading without diagnosis of hypertension 9/17/2021       Past Surgical History:   Procedure Laterality Date   • NO PAST SURGERIES         Family History   Problem Relation Age of Onset   • Testicular cancer Brother 35   • Anemia Mother    • No Known Problems Father    • Coronary artery disease Paternal Grandmother        Social History     Socioeconomic History   • Marital status:    Tobacco Use   • Smoking status: Never   • Smokeless tobacco: Never   Vaping Use   • Vaping Use: Never used   Substance and Sexual Activity   • Alcohol use: Yes     Alcohol/week: 2.0 - 4.0 standard drinks     Types: 2 - 4 Cans of beer per week     Comment:  "socially   • Drug use: Never   • Sexual activity: Yes     Partners: Female     Birth control/protection: OCP       Review of Systems   Constitutional: Positive for fatigue. Negative for appetite change, chills, fever, unexpected weight gain and unexpected weight loss.   HENT: Negative for ear pain, rhinorrhea, sore throat and trouble swallowing. Sinus pressure: mild.    Eyes: Negative for blurred vision and discharge.   Respiratory: Negative for cough, chest tightness and shortness of breath. Apnea: no snoring.    Cardiovascular: Negative for chest pain. Palpitations: has been avoiding caffeine for last 3 weeks and no change.   Gastrointestinal: Negative for abdominal pain, blood in stool, constipation, diarrhea, GERD and indigestion.   Endocrine: Negative for cold intolerance, heat intolerance and polydipsia.   Genitourinary: Negative for dysuria, frequency and nocturia.   Musculoskeletal: Negative for arthralgias and back pain (will have some discomfort in posterior ribs after sleeping for period of time, sleeps on his back).   Skin: Negative for rash and skin lesions.   Neurological: Negative for syncope and weakness.   Hematological: Does not bruise/bleed easily.   Psychiatric/Behavioral: Negative for suicidal ideas and depressed mood. Sleep disturbance: does not sleep great, hard to settle down; has trouble falling asleep and then will wake up after 5 hours and have trouble falling back to sleep; no OTC treatment; no concern. The patient is not nervous/anxious.        Objective   Vitals:    03/17/23 0754 03/17/23 0814 03/17/23 0815   BP: 118/72 110/84 128/84   BP Location: Left arm Left arm Right arm   Patient Position: Sitting Sitting Sitting   Cuff Size: Adult     Pulse: 75     Temp: 96.3 °F (35.7 °C)     TempSrc: Temporal     SpO2: 98%     Weight: 89.8 kg (198 lb)     Height: 180.3 cm (71\")       Body mass index is 27.62 kg/m².    Physical Exam  Vitals and nursing note reviewed.   Constitutional:       " General: He is not in acute distress.     Appearance: He is well-developed and well-groomed. He is not diaphoretic.   HENT:      Head: Normocephalic and atraumatic.      Jaw: No tenderness or pain on movement (popping of right TMJ).      Right Ear: External ear normal. No decreased hearing noted. A middle ear effusion is present. Right ear injected TM: mild.      Left Ear: External ear normal. No decreased hearing noted. A middle ear effusion is present. Left ear injected TM: mild.      Nose: Nose normal.      Right Turbinates: Swollen turbinates: mild.      Left Turbinates: Swollen turbinates: mild.      Right Sinus: No maxillary sinus tenderness or frontal sinus tenderness.      Left Sinus: No maxillary sinus tenderness or frontal sinus tenderness.      Mouth/Throat:      Mouth: Mucous membranes are moist.      Pharynx: No oropharyngeal exudate. Posterior oropharyngeal erythema: mild in posterior pharynx.   Eyes:      Extraocular Movements: Extraocular movements intact.      Conjunctiva/sclera: Conjunctivae normal.      Pupils: Pupils are equal, round, and reactive to light.   Neck:      Thyroid: No thyromegaly.      Vascular: No carotid bruit.      Trachea: No tracheal deviation.   Cardiovascular:      Rate and Rhythm: Normal rate and regular rhythm.      Pulses: Normal pulses.      Heart sounds: Normal heart sounds. No murmur heard.  Pulmonary:      Effort: Pulmonary effort is normal. No respiratory distress.      Breath sounds: Normal breath sounds.   Abdominal:      General: Bowel sounds are normal.      Palpations: Abdomen is soft. There is no hepatomegaly or splenomegaly.      Tenderness: There is no abdominal tenderness (mild to right of umbilicus, no change). There is no guarding or rebound.   Musculoskeletal:         General: Normal range of motion.      Cervical back: Normal range of motion and neck supple. No bony tenderness.      Thoracic back: No bony tenderness.      Lumbar back: No bony tenderness.       Right lower leg: No edema.      Left lower leg: No edema.   Lymphadenopathy:      Cervical: No cervical adenopathy.   Skin:     General: Skin is warm and dry.      Findings: No rash.   Neurological:      Mental Status: He is alert and oriented to person, place, and time.      Cranial Nerves: No cranial nerve deficit.      Motor: Motor function is intact.      Coordination: Coordination normal.      Gait: Gait normal.      Deep Tendon Reflexes: Reflexes are normal and symmetric.   Psychiatric:         Mood and Affect: Mood normal.         Behavior: Behavior normal.         Thought Content: Thought content normal.         Cognition and Memory: Cognition normal.         Judgment: Judgment normal.         Lab Results   Component Value Date    WBC 6.33 03/03/2023    RBC 5.60 03/03/2023    HGB 17.2 03/03/2023    HCT 49.4 03/03/2023    MCV 88.2 03/03/2023    MCH 30.7 03/03/2023    MCHC 34.8 03/03/2023    RDW 12.7 03/03/2023     03/03/2023    NEUTRORELPCT 71.0 03/03/2023    LYMPHORELPCT 19.6 03/03/2023    MONORELPCT 7.7 03/03/2023    EOSRELPCT 1.1 03/03/2023    BASORELPCT 0.3 03/03/2023    NEUTROABS 4.49 03/03/2023    LYMPHSABS 1.24 03/03/2023    MONOSABS 0.49 03/03/2023    EOSABS 0.07 03/03/2023    BASOSABS 0.02 03/03/2023    NRBC 0.0 03/03/2023     Lab Results   Component Value Date    GLUCOSE 92 03/03/2023    BUN 17 03/03/2023    CREATININE 1.08 03/03/2023    EGFRIFNONA 91 09/17/2021    EGFRIFAFRI 106 09/17/2021    BCR 15.7 03/03/2023    K 4.8 03/03/2023    CO2 28.7 03/03/2023    CALCIUM 10.9 (H) 03/03/2023    PROTENTOTREF 7.5 03/03/2023    ALBUMIN 5.6 (H) 03/03/2023    LABIL2 2.9 03/03/2023    AST 16 03/03/2023    ALT 24 03/03/2023      Lab Results   Component Value Date    CHLPL 173 03/03/2023    TRIG 68 03/03/2023    HDL 56 03/03/2023    VLDL 13 03/03/2023     (H) 03/03/2023     Lab Results   Component Value Date    TSH 1.440 09/17/2021 2/25/23 TSH 1.240  2/25/23 ECG WNL--sinus  rhythm    Assessment    Problem List Items Addressed This Visit     Palpitations    Relevant Orders    Ambulatory Referral to Cardiology    Elevated blood-pressure reading without diagnosis of hypertension    Current Assessment & Plan     Improving.  Continue to monitor your blood pressure periodically and record results.  Bring home BP machine to next office visit to compare readings.         Hypercalcemia    Relevant Orders    Basic Metabolic Panel    Vitamin D,25-Hydroxy    PTH, Intact    Fatigue    Relevant Orders    Vitamin B12 & Folate    Unequal blood pressures in paired extremities    Relevant Orders    Doppler Ankle Brachial Index Single Level CAR    Ambulatory Referral to Cardiology   Other Visit Diagnoses     Encounter for annual physical exam    -  Primary    Relevant Orders    Basic Metabolic Panel    Vitamin D,25-Hydroxy    PTH, Intact           Return in about 3 months (around 6/17/2023) for Recheck. or sooner if symptoms persist or worsen.  Impression: Health maintenance visit.  Currently, eats a healthy diet and has a regular exercise routine.  Colorectal cancer screening: not indicated.  Prostate cancer screening: not indicated.  Screening lab work includes: lipid.  Immunizations: UTD, declines flu vaccine; risks and benefits of immunizations were discussed with patient.  Advice and education were given regarding nutrition and aerobic exercise.  Home BP readings have still been slightly increased, but have improved; patient will bring home BP machine to next office visit to compare readings.       COVID-19 Precautions - Patient was compliant in wearing a mask. When I saw the patient, I used appropriate personal protective equipment (PPE) including mask, gloves, and eye shield (standard procedure).  Hand hygiene was completed before and after seeing the patient.

## 2023-03-17 ENCOUNTER — OFFICE VISIT (OUTPATIENT)
Dept: FAMILY MEDICINE CLINIC | Facility: CLINIC | Age: 33
End: 2023-03-17
Payer: COMMERCIAL

## 2023-03-17 VITALS
TEMPERATURE: 96.3 F | OXYGEN SATURATION: 98 % | WEIGHT: 198 LBS | HEART RATE: 75 BPM | BODY MASS INDEX: 27.72 KG/M2 | HEIGHT: 71 IN | DIASTOLIC BLOOD PRESSURE: 84 MMHG | SYSTOLIC BLOOD PRESSURE: 128 MMHG

## 2023-03-17 DIAGNOSIS — R00.2 PALPITATIONS: ICD-10-CM

## 2023-03-17 DIAGNOSIS — E83.52 HYPERCALCEMIA: ICD-10-CM

## 2023-03-17 DIAGNOSIS — Z00.00 ENCOUNTER FOR ANNUAL PHYSICAL EXAM: Primary | ICD-10-CM

## 2023-03-17 DIAGNOSIS — R53.83 FATIGUE, UNSPECIFIED TYPE: ICD-10-CM

## 2023-03-17 DIAGNOSIS — R03.0 ELEVATED BLOOD-PRESSURE READING WITHOUT DIAGNOSIS OF HYPERTENSION: ICD-10-CM

## 2023-03-17 DIAGNOSIS — R09.89 UNEQUAL BLOOD PRESSURES IN PAIRED EXTREMITIES: ICD-10-CM

## 2023-03-17 PROBLEM — J02.9 PHARYNGITIS: Status: RESOLVED | Noted: 2022-03-16 | Resolved: 2023-03-17

## 2023-03-17 PROCEDURE — 99395 PREV VISIT EST AGE 18-39: CPT | Performed by: NURSE PRACTITIONER

## 2023-03-17 NOTE — ASSESSMENT & PLAN NOTE
Improving.  Continue to monitor your blood pressure periodically and record results.  Bring home BP machine to next office visit to compare readings.

## 2023-03-17 NOTE — PATIENT INSTRUCTIONS
Continue to monitor your blood pressure periodically and record results.  Bring home BP machine to next office visit to compare readings.  Continue to work on healthy diet and exercise.  Follow up pending lab results.  Follow up in 3 months, or sooner if symptoms persist or worsen.

## 2023-03-18 LAB
25(OH)D3+25(OH)D2 SERPL-MCNC: 26.9 NG/ML (ref 30–100)
BUN SERPL-MCNC: 18 MG/DL (ref 6–20)
BUN/CREAT SERPL: 18 (ref 9–20)
CALCIUM SERPL-MCNC: 9.8 MG/DL (ref 8.7–10.2)
CHLORIDE SERPL-SCNC: 104 MMOL/L (ref 96–106)
CO2 SERPL-SCNC: 23 MMOL/L (ref 20–29)
CREAT SERPL-MCNC: 1 MG/DL (ref 0.76–1.27)
EGFRCR SERPLBLD CKD-EPI 2021: 102 ML/MIN/1.73
FOLATE SERPL-MCNC: 6.7 NG/ML
GLUCOSE SERPL-MCNC: 88 MG/DL (ref 70–99)
POTASSIUM SERPL-SCNC: 4.5 MMOL/L (ref 3.5–5.2)
PTH-INTACT SERPL-MCNC: 21 PG/ML (ref 15–65)
SODIUM SERPL-SCNC: 141 MMOL/L (ref 134–144)
VIT B12 SERPL-MCNC: 585 PG/ML (ref 232–1245)

## 2023-03-20 DIAGNOSIS — E55.9 VITAMIN D DEFICIENCY: Primary | ICD-10-CM

## 2023-03-20 RX ORDER — MULTIVIT-MIN/IRON/FOLIC ACID/K 18-600-40
2000 CAPSULE ORAL DAILY
Start: 2023-03-20

## 2023-04-12 ENCOUNTER — TELEPHONE (OUTPATIENT)
Dept: FAMILY MEDICINE CLINIC | Facility: CLINIC | Age: 33
End: 2023-04-12
Payer: COMMERCIAL

## 2023-04-12 NOTE — TELEPHONE ENCOUNTER
[place] FOR HUB TO READ & SCHEDULE:    [facility] - The patient needs to reschedule their 6/16/2023 appointment with FRANSISCA Kelley. The provider will be out of the office. Please reschedule the appointment.

## 2023-05-10 ENCOUNTER — HOSPITAL ENCOUNTER (OUTPATIENT)
Dept: CARDIOLOGY | Facility: HOSPITAL | Age: 33
Discharge: HOME | End: 2023-05-10
Admitting: NURSE PRACTITIONER
Payer: COMMERCIAL

## 2023-05-10 DIAGNOSIS — R09.89 UNEQUAL BLOOD PRESSURES IN PAIRED EXTREMITIES: ICD-10-CM

## 2023-05-10 LAB
BH CV LOWER ARTERIAL LEFT ABI RATIO: 0.97
BH CV LOWER ARTERIAL LEFT DORSALIS PEDIS SYS MAX: 135
BH CV LOWER ARTERIAL LEFT GREAT TOE SYS MAX: 102
BH CV LOWER ARTERIAL LEFT POST TIBIAL SYS MAX: 138
BH CV LOWER ARTERIAL LEFT TBI RATIO: 0.72
BH CV LOWER ARTERIAL RIGHT ABI RATIO: 1.11
BH CV LOWER ARTERIAL RIGHT DORSALIS PEDIS SYS MAX: 157
BH CV LOWER ARTERIAL RIGHT GREAT TOE SYS MAX: 103
BH CV LOWER ARTERIAL RIGHT POST TIBIAL SYS MAX: 144
BH CV LOWER ARTERIAL RIGHT TBI RATIO: 0.73
MAXIMAL PREDICTED HEART RATE: 187 BPM
STRESS TARGET HR: 159 BPM
UPPER ARTERIAL LEFT ARM BRACHIAL SYS MAX: 133 MMHG
UPPER ARTERIAL RIGHT ARM BRACHIAL SYS MAX: 142 MMHG

## 2023-05-10 PROCEDURE — 93922 UPR/L XTREMITY ART 2 LEVELS: CPT

## 2023-06-12 ENCOUNTER — TELEPHONE (OUTPATIENT)
Dept: FAMILY MEDICINE CLINIC | Facility: CLINIC | Age: 33
End: 2023-06-12
Payer: COMMERCIAL

## 2023-06-12 NOTE — TELEPHONE ENCOUNTER
OKAY FOR HUB TO READ & SCHEDULE:    [facility] - The patient needs to reschedule their 6/16/23 appointment with FRANSISCA Kelley. The provider will be out of the office. Please reschedule the appointment

## 2024-10-21 ENCOUNTER — OFFICE VISIT (OUTPATIENT)
Dept: FAMILY MEDICINE CLINIC | Facility: CLINIC | Age: 34
End: 2024-10-21
Payer: COMMERCIAL

## 2024-10-21 DIAGNOSIS — R53.83 FATIGUE, UNSPECIFIED TYPE: ICD-10-CM

## 2024-10-21 DIAGNOSIS — G43.909 MIGRAINE WITHOUT STATUS MIGRAINOSUS, NOT INTRACTABLE, UNSPECIFIED MIGRAINE TYPE: ICD-10-CM

## 2024-10-21 DIAGNOSIS — Z00.00 ENCOUNTER FOR ANNUAL PHYSICAL EXAM: Primary | ICD-10-CM

## 2024-10-21 DIAGNOSIS — Z13.220 ENCOUNTER FOR LIPID SCREENING FOR CARDIOVASCULAR DISEASE: ICD-10-CM

## 2024-10-21 DIAGNOSIS — J30.9 ALLERGIC RHINITIS, UNSPECIFIED SEASONALITY, UNSPECIFIED TRIGGER: ICD-10-CM

## 2024-10-21 DIAGNOSIS — Z13.6 ENCOUNTER FOR LIPID SCREENING FOR CARDIOVASCULAR DISEASE: ICD-10-CM

## 2024-10-21 DIAGNOSIS — E66.3 OVERWEIGHT WITH BODY MASS INDEX (BMI) OF 29 TO 29.9 IN ADULT: ICD-10-CM

## 2024-10-21 DIAGNOSIS — R00.2 PALPITATIONS: ICD-10-CM

## 2024-10-21 DIAGNOSIS — R03.0 ELEVATED BLOOD-PRESSURE READING WITHOUT DIAGNOSIS OF HYPERTENSION: ICD-10-CM

## 2024-10-21 DIAGNOSIS — E55.9 VITAMIN D DEFICIENCY: ICD-10-CM

## 2024-10-21 PROBLEM — R10.813 RIGHT LOWER QUADRANT ABDOMINAL TENDERNESS WITHOUT REBOUND TENDERNESS: Status: RESOLVED | Noted: 2023-03-04 | Resolved: 2024-10-21

## 2024-10-21 PROCEDURE — 99395 PREV VISIT EST AGE 18-39: CPT | Performed by: NURSE PRACTITIONER

## 2024-10-21 RX ORDER — LEVOCETIRIZINE DIHYDROCHLORIDE 5 MG/1
5 TABLET, FILM COATED ORAL EVERY EVENING
COMMUNITY

## 2024-10-21 RX ORDER — FLUTICASONE PROPIONATE 50 UG/1
2 SPRAY, METERED NASAL DAILY
COMMUNITY

## 2024-10-21 NOTE — PATIENT INSTRUCTIONS
Continue increased intake of clear liquids.  Keep headache diary.  Schedule eye exam.  Try taking Xyzal at night and see if helps.  Monitor your blood pressure 2-3 times per week and record results.  Continue to work on healthy diet and exercise.  Follow up pending lab results.  Follow up in 1 month, or sooner if symptoms persist or worsen.

## 2024-10-21 NOTE — PROGRESS NOTES
Subjective   Christ Hummel is a 34 y.o. male.     Chief Complaint   Patient presents with    Annual Exam       History of Present Illness   Patient presents for CPE with non-fasting labs; healthy diet; not much exercise recently, planning to get back into routine, but has had decreased energy after work; regular dental visits; no recent eye exam; no problems hearing; immunizations: UTD, declines flu and COVID-19 booster; colonoscopy never performed; no family history of colon cancer.     F/U palpitations: had normal Holter monitor; did not see cardiology; no problems recently.    Has had some some migraine type headaches more than in past; will get headache every 7 days or so; has more fatigue in last 3 months; has been hard to work out due to fatigue; will have some light sensitivity and nausea/vomiting with headaches; if takes med with onset of headache, will prevent nausea/vomiting; takes Dual Action Tylenol/Ibuprofen 2 tabs as needed and helps; drinks plenty of liquids; does 3-4 hours of work on computer daily; no runny/stuffy nose or ear pain; takes Xyzal and uses Flonase daily; gets average of 6 hours of sleep per night; does snore some, does not seem to be consistently; no waking up SOA; feels rested when wakes up in mornings; headaches seem random; will feel near syncope with headaches; no syncope; has had trouble with headaches for long time, but seem more frequent.      The following portions of the patient's history were reviewed and updated as appropriate: allergies, current medications, past family history, past medical history, past social history, past surgical history and problem list.    Current Outpatient Medications on File Prior to Visit   Medication Sig    fluticasone (FLONASE) 50 MCG/ACT nasal spray Administer 2 sprays into the nostril(s) as directed by provider Daily.    levocetirizine (XYZAL) 5 MG tablet Take 1 tablet by mouth Every Evening.     No current facility-administered medications on  file prior to visit.        Past Medical History:   Diagnosis Date    Elevated blood-pressure reading without diagnosis of hypertension 9/17/2021       Past Surgical History:   Procedure Laterality Date    TOOTH EXTRACTION         Family History   Problem Relation Age of Onset    Testicular cancer Brother 35    Anemia Mother     No Known Problems Father     Coronary artery disease Paternal Grandmother        Social History     Socioeconomic History    Marital status:    Tobacco Use    Smoking status: Never    Smokeless tobacco: Never   Vaping Use    Vaping status: Never Used   Substance and Sexual Activity    Alcohol use: Yes     Alcohol/week: 2.0 standard drinks of alcohol     Types: 2 Cans of beer per week     Comment: socially    Drug use: Never    Sexual activity: Yes     Partners: Female     Birth control/protection: OCP       Review of Systems   Constitutional:  Positive for fatigue. Negative for appetite change, chills, fever, unexpected weight gain (some increase in weight in the last month despite eating more healthy) and unexpected weight loss.   HENT:  Negative for ear pain, sinus pressure, sore throat and trouble swallowing.    Eyes:  Negative for blurred vision (see HPI) and discharge.   Respiratory:  Negative for cough, chest tightness and shortness of breath.    Cardiovascular:  Negative for chest pain, palpitations and leg swelling.   Gastrointestinal:  Negative for abdominal pain, blood in stool, constipation, diarrhea, GERD and indigestion.   Endocrine: Negative for cold intolerance, heat intolerance and polydipsia.   Genitourinary:  Negative for dysuria, frequency and nocturia.   Neurological:  Negative for dizziness. Light-headedness: some with migraines. Headache: see HPI.  Hematological:  Does not bruise/bleed easily.   Psychiatric/Behavioral:  Negative for depressed mood. The patient is not nervous/anxious.        Objective   Vitals:    10/21/24 1425 10/21/24 1500   BP: 114/84 122/90  "  BP Location: Left arm Right arm   Patient Position: Sitting Sitting   Cuff Size: Large Adult    Pulse: 62    Temp: 98 °F (36.7 °C)    TempSrc: Temporal    SpO2: 97%    Weight: 95.3 kg (210 lb)    Height: 180.3 cm (71\")      Body mass index is 29.29 kg/m².    Physical Exam  Vitals and nursing note reviewed.   Constitutional:       General: He is not in acute distress.     Appearance: He is well-developed and well-groomed. He is not diaphoretic.   HENT:      Head: Normocephalic and atraumatic.      Jaw: No tenderness or pain on movement.      Right Ear: External ear normal. No decreased hearing noted. Right ear middle ear effusion: mild. Tympanic membrane is injected.      Left Ear: External ear normal. No decreased hearing noted. Left ear middle ear effusion: mild. Tympanic membrane is injected.      Nose: Nose normal.      Right Sinus: No maxillary sinus tenderness or frontal sinus tenderness.      Left Sinus: No maxillary sinus tenderness or frontal sinus tenderness.      Mouth/Throat:      Mouth: Mucous membranes are moist.      Pharynx: No oropharyngeal exudate or posterior oropharyngeal erythema.   Eyes:      Extraocular Movements: Extraocular movements intact.      Conjunctiva/sclera: Conjunctivae normal.      Pupils: Pupils are equal, round, and reactive to light.   Neck:      Thyroid: No thyromegaly.      Vascular: No carotid bruit.      Trachea: No tracheal deviation.   Cardiovascular:      Rate and Rhythm: Normal rate and regular rhythm.      Pulses: Normal pulses.      Heart sounds: Normal heart sounds. No murmur heard.  Pulmonary:      Effort: Pulmonary effort is normal. No respiratory distress.      Breath sounds: Normal breath sounds.   Abdominal:      General: Bowel sounds are normal.      Palpations: Abdomen is soft. There is no hepatomegaly or splenomegaly.      Tenderness: There is no abdominal tenderness. There is no guarding.   Musculoskeletal:         General: Normal range of motion.      " Cervical back: Normal range of motion and neck supple. No bony tenderness.      Thoracic back: No bony tenderness.      Lumbar back: No bony tenderness.      Right lower leg: No edema.      Left lower leg: No edema.   Lymphadenopathy:      Cervical: No cervical adenopathy.   Skin:     General: Skin is warm and dry.      Findings: No rash.   Neurological:      Mental Status: He is alert and oriented to person, place, and time.      Cranial Nerves: No cranial nerve deficit.      Motor: Motor function is intact.      Coordination: Coordination normal.      Gait: Gait normal.      Deep Tendon Reflexes: Reflexes are normal and symmetric.   Psychiatric:         Mood and Affect: Mood normal.         Behavior: Behavior normal.         Thought Content: Thought content normal.         Cognition and Memory: Cognition normal.         Judgment: Judgment normal.         Lab Results   Component Value Date    WBC 6.33 03/03/2023    RBC 5.60 03/03/2023    HGB 17.2 03/03/2023    HCT 49.4 03/03/2023    MCV 88.2 03/03/2023    MCH 30.7 03/03/2023    MCHC 34.8 03/03/2023    RDW 12.7 03/03/2023    MPV 10.2 02/25/2023     03/03/2023    NEUTRORELPCT 71.0 03/03/2023    LYMPHORELPCT 19.6 03/03/2023    MONORELPCT 7.7 03/03/2023    EOSRELPCT 1.1 03/03/2023    BASORELPCT 0.3 03/03/2023    AUTOIGPER 0.5 02/25/2023    NEUTROABS 4.49 03/03/2023    LYMPHSABS 1.24 03/03/2023    MONOSABS 0.49 03/03/2023    EOSABS 0.07 03/03/2023    BASOSABS 0.02 03/03/2023    AUTOIGNUM 0.04 02/25/2023    NRBC 0.0 03/03/2023     Lab Results   Component Value Date    GLUCOSE 88 03/17/2023    BUN 18 03/17/2023    CREATININE 1.00 03/17/2023    EGFRIFNONA 91 09/17/2021    EGFRIFAFRI >60 02/25/2023    BCR 18 03/17/2023    K 4.5 03/17/2023    CO2 23 03/17/2023    CALCIUM 9.8 03/17/2023    PROTENTOTREF 7.5 03/03/2023    ALBUMIN 5.6 (H) 03/03/2023    LABIL2 2.9 03/03/2023    AST 16 03/03/2023    ALT 24 03/03/2023      Lab Results   Component Value Date    CHLPL 173  03/03/2023    TRIG 68 03/03/2023    HDL 56 03/03/2023    VLDL 13 03/03/2023     (H) 03/03/2023     Lab Results   Component Value Date    TSH 1.440 09/17/2021       Assessment    Problem List Items Addressed This Visit       RESOLVED: Palpitations    Elevated blood-pressure reading without diagnosis of hypertension    Current Assessment & Plan     Monitor your blood pressure 2-3 times per week and record results.  Continue to work on healthy diet and exercise.         Allergic rhinitis    Current Assessment & Plan     Continue Xyzal and Flonase daily.         Fatigue    Current Assessment & Plan     Try taking Xyzal at night instead and see if helps.         Relevant Orders    Vitamin B12 & Folate    Comprehensive Metabolic Panel    CBC & Differential    TSH Rfx On Abnormal To Free T4    Vitamin D deficiency    Relevant Orders    Vitamin D,25-Hydroxy    Migraine without status migrainosus, not intractable    Current Assessment & Plan     Continue increased intake of clear liquids.  Keep headache diary.  Schedule eye exam.         Relevant Orders    Vitamin B12 & Folate    CBC & Differential    Overweight with body mass index (BMI) of 29 to 29.9 in adult    Current Assessment & Plan     Patient's (Body mass index is 29.29 kg/m².) indicates that they are overweight with health conditions that include none . Weight is unchanged. BMI is above average; BMI management plan is completed. We discussed low calorie, low carb based diet program, portion control, and increasing exercise.           Other Visit Diagnoses       Encounter for annual physical exam    -  Primary    Relevant Orders    Vitamin D,25-Hydroxy    Vitamin B12 & Folate    Comprehensive Metabolic Panel    CBC & Differential    Lipid Panel With LDL / HDL Ratio    TSH Rfx On Abnormal To Free T4    Encounter for lipid screening for cardiovascular disease        Relevant Orders    Lipid Panel With LDL / HDL Ratio             Return in about 1 month (around  11/21/2024) for Recheck.or sooner if symptoms persist or worsen.  Impression: Health maintenance visit.  Currently, eats a healthy diet and has an inadequate exercise routine.   Colorectal cancer screening: not indicated.  Prostate cancer screening: not indicated.  Screening lab work includes: CMP, lipid.  Immunizations: UTD, declines flu and COVID-19 booster; risks and benefits of immunizations were discussed with patient.  Patient was advised to be evaluated by ophthalmology.  Advice and education were given regarding nutrition and aerobic exercise.

## 2024-10-22 VITALS
SYSTOLIC BLOOD PRESSURE: 122 MMHG | DIASTOLIC BLOOD PRESSURE: 90 MMHG | HEART RATE: 62 BPM | TEMPERATURE: 98 F | BODY MASS INDEX: 29.4 KG/M2 | HEIGHT: 71 IN | OXYGEN SATURATION: 97 % | WEIGHT: 210 LBS

## 2024-10-22 DIAGNOSIS — E55.9 VITAMIN D DEFICIENCY: ICD-10-CM

## 2024-10-22 PROBLEM — E66.3 OVERWEIGHT WITH BODY MASS INDEX (BMI) OF 29 TO 29.9 IN ADULT: Status: ACTIVE | Noted: 2024-10-22

## 2024-10-22 LAB
25(OH)D3+25(OH)D2 SERPL-MCNC: 24.8 NG/ML (ref 30–100)
ALBUMIN SERPL-MCNC: 4.9 G/DL (ref 4.1–5.1)
ALP SERPL-CCNC: 53 IU/L (ref 44–121)
ALT SERPL-CCNC: 42 IU/L (ref 0–44)
AST SERPL-CCNC: 24 IU/L (ref 0–40)
BASOPHILS # BLD AUTO: 0.1 X10E3/UL (ref 0–0.2)
BASOPHILS NFR BLD AUTO: 1 %
BILIRUB SERPL-MCNC: 0.6 MG/DL (ref 0–1.2)
BUN SERPL-MCNC: 17 MG/DL (ref 6–20)
BUN/CREAT SERPL: 19 (ref 9–20)
CALCIUM SERPL-MCNC: 10.2 MG/DL (ref 8.7–10.2)
CHLORIDE SERPL-SCNC: 102 MMOL/L (ref 96–106)
CHOLEST SERPL-MCNC: 226 MG/DL (ref 100–199)
CO2 SERPL-SCNC: 22 MMOL/L (ref 20–29)
CREAT SERPL-MCNC: 0.9 MG/DL (ref 0.76–1.27)
EGFRCR SERPLBLD CKD-EPI 2021: 114 ML/MIN/1.73
EOSINOPHIL # BLD AUTO: 0.3 X10E3/UL (ref 0–0.4)
EOSINOPHIL NFR BLD AUTO: 4 %
ERYTHROCYTE [DISTWIDTH] IN BLOOD BY AUTOMATED COUNT: 12.5 % (ref 11.6–15.4)
FOLATE SERPL-MCNC: 5.6 NG/ML
GLOBULIN SER CALC-MCNC: 2.1 G/DL (ref 1.5–4.5)
GLUCOSE SERPL-MCNC: 83 MG/DL (ref 70–99)
HCT VFR BLD AUTO: 46.9 % (ref 37.5–51)
HDLC SERPL-MCNC: 51 MG/DL
HGB BLD-MCNC: 15.6 G/DL (ref 13–17.7)
IMM GRANULOCYTES # BLD AUTO: 0.1 X10E3/UL (ref 0–0.1)
IMM GRANULOCYTES NFR BLD AUTO: 1 %
LDLC SERPL CALC-MCNC: 135 MG/DL (ref 0–99)
LDLC/HDLC SERPL: 2.6 RATIO (ref 0–3.6)
LYMPHOCYTES # BLD AUTO: 1.9 X10E3/UL (ref 0.7–3.1)
LYMPHOCYTES NFR BLD AUTO: 29 %
MCH RBC QN AUTO: 29.8 PG (ref 26.6–33)
MCHC RBC AUTO-ENTMCNC: 33.3 G/DL (ref 31.5–35.7)
MCV RBC AUTO: 90 FL (ref 79–97)
MONOCYTES # BLD AUTO: 0.4 X10E3/UL (ref 0.1–0.9)
MONOCYTES NFR BLD AUTO: 6 %
NEUTROPHILS # BLD AUTO: 4 X10E3/UL (ref 1.4–7)
NEUTROPHILS NFR BLD AUTO: 59 %
PLATELET # BLD AUTO: 233 X10E3/UL (ref 150–450)
POTASSIUM SERPL-SCNC: 4.5 MMOL/L (ref 3.5–5.2)
PROT SERPL-MCNC: 7 G/DL (ref 6–8.5)
RBC # BLD AUTO: 5.24 X10E6/UL (ref 4.14–5.8)
SODIUM SERPL-SCNC: 142 MMOL/L (ref 134–144)
TRIGL SERPL-MCNC: 227 MG/DL (ref 0–149)
TSH SERPL DL<=0.005 MIU/L-ACNC: 1.65 UIU/ML (ref 0.45–4.5)
VIT B12 SERPL-MCNC: 542 PG/ML (ref 232–1245)
VLDLC SERPL CALC-MCNC: 40 MG/DL (ref 5–40)
WBC # BLD AUTO: 6.7 X10E3/UL (ref 3.4–10.8)

## 2024-10-22 RX ORDER — MULTIVIT-MIN/IRON/FOLIC ACID/K 18-600-40
2000 CAPSULE ORAL DAILY
Start: 2024-10-22

## 2024-10-22 NOTE — ASSESSMENT & PLAN NOTE
Patient's (Body mass index is 29.29 kg/m².) indicates that they are overweight with health conditions that include none . Weight is unchanged. BMI is above average; BMI management plan is completed. We discussed low calorie, low carb based diet program, portion control, and increasing exercise.

## 2024-10-22 NOTE — ASSESSMENT & PLAN NOTE
Monitor your blood pressure 2-3 times per week and record results.  Continue to work on healthy diet and exercise.

## 2024-11-20 ENCOUNTER — OFFICE VISIT (OUTPATIENT)
Dept: FAMILY MEDICINE CLINIC | Facility: CLINIC | Age: 34
End: 2024-11-20
Payer: COMMERCIAL

## 2024-11-20 VITALS
BODY MASS INDEX: 29.54 KG/M2 | HEART RATE: 77 BPM | HEIGHT: 71 IN | WEIGHT: 211 LBS | OXYGEN SATURATION: 97 % | TEMPERATURE: 98.4 F | SYSTOLIC BLOOD PRESSURE: 118 MMHG | DIASTOLIC BLOOD PRESSURE: 80 MMHG

## 2024-11-20 DIAGNOSIS — R03.0 ELEVATED BLOOD-PRESSURE READING WITHOUT DIAGNOSIS OF HYPERTENSION: Primary | ICD-10-CM

## 2024-11-20 DIAGNOSIS — J30.9 ALLERGIC RHINITIS, UNSPECIFIED SEASONALITY, UNSPECIFIED TRIGGER: ICD-10-CM

## 2024-11-20 DIAGNOSIS — G43.909 MIGRAINE WITHOUT STATUS MIGRAINOSUS, NOT INTRACTABLE, UNSPECIFIED MIGRAINE TYPE: ICD-10-CM

## 2024-11-20 PROBLEM — R09.89 UNEQUAL BLOOD PRESSURES IN PAIRED EXTREMITIES: Status: RESOLVED | Noted: 2023-03-17 | Resolved: 2024-11-20

## 2024-11-20 PROBLEM — E83.52 HYPERCALCEMIA: Status: RESOLVED | Noted: 2023-03-17 | Resolved: 2024-11-20

## 2024-11-20 PROCEDURE — 99213 OFFICE O/P EST LOW 20 MIN: CPT | Performed by: NURSE PRACTITIONER

## 2024-11-20 NOTE — ASSESSMENT & PLAN NOTE
Bring home BP machine to office to compare readings.  Continue to monitor your blood pressure and record results.  Decrease intake of salt in diet.  Continue to work on healthy diet and exercise.

## 2024-11-20 NOTE — PROGRESS NOTES
Subjective   Christ Hummel is a 34 y.o. male.     Chief Complaint   Patient presents with    Hypertension     Still up at home       Migraine     Follow up        History of Present Illness   Patient presents for follow up elevated BP; has been monitoring BP at home and has been running 140-150s/80s; has had some headaches; no orthostasis; no swelling; regular exercise, stays active; eats a lot of salt in diet; has started drinking beet juice last couple of weeks and has seemed to help BP.    F/U fatigue: changed to taking Xyzal at night and still feeling tired; some days does not allow enough time for sleep; no snoring.    F/U migraines: headaches have decreased to about once per week; no change in vision with headaches; some nausea, no vomiting with headaches; headaches seem to be related to change in weather; takes Xyzal and Flonase daily and help; no recent eye exam; drinking plenty of liquids.    F/U Vitamin D deficiency: resumed Vitamin D daily.        The following portions of the patient's history were reviewed and updated as appropriate: allergies, current medications, past family history, past medical history, past social history, past surgical history and problem list.    Current Outpatient Medications on File Prior to Visit   Medication Sig    Cholecalciferol (Vitamin D) 50 MCG (2000 UT) capsule Take 1 capsule by mouth Daily.    fluticasone (FLONASE) 50 MCG/ACT nasal spray Administer 2 sprays into the nostril(s) as directed by provider Daily.    levocetirizine (XYZAL) 5 MG tablet Take 1 tablet by mouth Every Evening.     No current facility-administered medications on file prior to visit.        Past Medical History:   Diagnosis Date    Elevated blood-pressure reading without diagnosis of hypertension 9/17/2021       Past Surgical History:   Procedure Laterality Date    TOOTH EXTRACTION         Family History   Problem Relation Age of Onset    Testicular cancer Brother 35    Anemia Mother     No Known  "Problems Father     Coronary artery disease Paternal Grandmother        Social History     Socioeconomic History    Marital status:    Tobacco Use    Smoking status: Never    Smokeless tobacco: Never   Vaping Use    Vaping status: Never Used   Substance and Sexual Activity    Alcohol use: Yes     Alcohol/week: 2.0 standard drinks of alcohol     Types: 2 Cans of beer per week     Comment: socially    Drug use: Never    Sexual activity: Yes     Partners: Female     Birth control/protection: OCP       Review of Systems   Constitutional:  Positive for fatigue. Negative for appetite change, unexpected weight gain and unexpected weight loss.   HENT:  Negative for ear pain, sore throat and trouble swallowing.    Eyes:  Negative for blurred vision.   Respiratory:  Negative for cough, chest tightness and shortness of breath.    Cardiovascular:  Negative for chest pain and palpitations.   Gastrointestinal:  Negative for abdominal pain, constipation and diarrhea.   Genitourinary:  Negative for dysuria and frequency.   Skin:  Negative for rash.   Neurological:  Negative for syncope and weakness.       Objective   Vitals:    11/20/24 1527 11/20/24 1615 11/20/24 1620   BP: 120/78 110/80 118/80   BP Location: Left arm Left arm Right arm   Patient Position: Sitting Sitting Sitting   Cuff Size: Adult     Pulse: 77     Temp: 98.4 °F (36.9 °C)     SpO2: 97%     Weight: 95.7 kg (211 lb)     Height: 180.3 cm (71\")       Body mass index is 29.43 kg/m².    Physical Exam  Vitals and nursing note reviewed.   Constitutional:       General: He is not in acute distress.     Appearance: He is well-developed and well-groomed. He is not diaphoretic.   HENT:      Head: Normocephalic.      Right Ear: External ear normal.      Left Ear: External ear normal.   Eyes:      Conjunctiva/sclera: Conjunctivae normal.   Neck:      Vascular: No carotid bruit.   Cardiovascular:      Rate and Rhythm: Normal rate and regular rhythm.      Pulses: Normal " pulses.      Heart sounds: Normal heart sounds. No murmur heard.  Pulmonary:      Effort: Pulmonary effort is normal. No respiratory distress.      Breath sounds: Normal breath sounds.   Abdominal:      General: Bowel sounds are normal.      Palpations: Abdomen is soft. There is no hepatomegaly or splenomegaly.      Tenderness: There is no abdominal tenderness. There is no guarding.   Musculoskeletal:      Cervical back: Normal range of motion and neck supple.      Right lower leg: No edema.      Left lower leg: No edema.   Skin:     General: Skin is warm and dry.      Findings: No rash.   Neurological:      Mental Status: He is alert and oriented to person, place, and time.      Gait: Gait normal.   Psychiatric:         Mood and Affect: Mood normal.         Behavior: Behavior normal.         Thought Content: Thought content normal.         Cognition and Memory: Cognition normal.         Judgment: Judgment normal.         Lab Results   Component Value Date    WBC 6.7 10/21/2024    RBC 5.24 10/21/2024    HGB 15.6 10/21/2024    HCT 46.9 10/21/2024    MCV 90 10/21/2024    MCH 29.8 10/21/2024    MCHC 33.3 10/21/2024    RDW 12.5 10/21/2024    MPV 10.2 02/25/2023     10/21/2024    NEUTRORELPCT 59 10/21/2024    LYMPHORELPCT 29 10/21/2024    MONORELPCT 6 10/21/2024    EOSRELPCT 4 10/21/2024    BASORELPCT 1 10/21/2024    AUTOIGPER 0.5 02/25/2023    NEUTROABS 4.0 10/21/2024    LYMPHSABS 1.9 10/21/2024    MONOSABS 0.4 10/21/2024    EOSABS 0.3 10/21/2024    BASOSABS 0.1 10/21/2024    AUTOIGNUM 0.04 02/25/2023    NRBC 0.0 03/03/2023     Lab Results   Component Value Date    GLUCOSE 83 10/21/2024    BUN 17 10/21/2024    CREATININE 0.90 10/21/2024    EGFRIFNONA 91 09/17/2021    EGFRIFAFRI >60 02/25/2023    BCR 19 10/21/2024    K 4.5 10/21/2024    CO2 22 10/21/2024    CALCIUM 10.2 10/21/2024    PROTENTOTREF 7.0 10/21/2024    ALBUMIN 4.9 10/21/2024    LABIL2 2.9 03/03/2023    AST 24 10/21/2024    ALT 42 10/21/2024      Lab  Results   Component Value Date    CHLPL 226 (H) 10/21/2024    TRIG 227 (H) 10/21/2024    HDL 51 10/21/2024    VLDL 40 10/21/2024     (H) 10/21/2024     Lab Results   Component Value Date    TSH 1.650 10/21/2024         Assessment    Problem List Items Addressed This Visit       Elevated blood-pressure reading without diagnosis of hypertension - Primary    Current Assessment & Plan     Bring home BP machine to office to compare readings.  Continue to monitor your blood pressure and record results.  Decrease intake of salt in diet.  Continue to work on healthy diet and exercise.         Allergic rhinitis    Current Assessment & Plan     Stable.  Continue Xyzal nightly and Flonase daily.         Migraine without status migrainosus, not intractable    Current Assessment & Plan     Improving.  Continue to drink plenty of liquids.  Continue Xyzal and Flonase daily.  Schedule eye exam.             Return in about 3 months (around 2/20/2025) for Recheck.or sooner if symptoms persist or worsen.  Home BP machine seems to run higher than manual BP; pt went home and checked BP and was 151/93 on left and 149/95 on the right just after leaving the office; pt will bring home BP machine to office to compare readings in the next several days.

## 2024-11-20 NOTE — ASSESSMENT & PLAN NOTE
Improving.  Continue to drink plenty of liquids.  Continue Xyzal and Flonase daily.  Schedule eye exam.

## 2024-11-20 NOTE — PATIENT INSTRUCTIONS
Continue to monitor your blood pressure and record results.  Decrease intake of salt in diet.  Continue to work on healthy diet and exercise.  Follow up in 3 months, or sooner if problems or concerns.

## 2025-02-12 ENCOUNTER — NURSE TRIAGE (OUTPATIENT)
Dept: CALL CENTER | Facility: HOSPITAL | Age: 35
End: 2025-02-12
Payer: COMMERCIAL

## 2025-02-12 NOTE — TELEPHONE ENCOUNTER
Reason for Disposition   [1] After 72 hours AND [2] chest pain not improving    Additional Information   Negative: Major injury from dangerous force or speed (e.g., MVA, fall > 10 feet or 3 meters)   Negative: Bullet wound, knife wound, or other penetrating object   Negative: Puncture wound that sounds life-threatening to the triager   Negative: SEVERE difficulty breathing (e.g., struggling for each breath, speaks in single words)   Negative: [1] Major bleeding (e.g., actively dripping or spurting) AND [2] can't be stopped   Negative: Open wound of the chest with sound of moving air (sucking wound) or visible air bubbles   Negative: Shock suspected (e.g., cold/pale/clammy skin, too weak to stand, low BP, rapid pulse)   Negative: Coughing or spitting up blood   Negative: Bluish (or gray) lips or face now   Negative: Unconscious or was unconscious   Negative: Sounds like a life-threatening emergency to the triager   Negative: [1] Injuries at more than 1 site AND [2] unsure which guideline to use   Negative: Chest pain not from an injury OR cause is unknown   Negative: Wound looks infected   Negative: SEVERE chest pain   Negative: [1] Difficulty breathing AND [2] not severe   Negative: Skin is split open or gaping   Negative: [1] Bleeding AND [2] won't stop after 10 minutes of direct pressure (using correct technique)   Negative: Sounds like a serious injury to the triager   Negative: [1] Can't take a deep breath BUT [2] no respiratory distress   Negative: Shallow puncture wound   Negative: [1] Collarbone is painful AND [2] difficulty raising arm   Negative: Suspicious history for the injury   Negative: Patient is confused or is an unreliable provider of information (e.g., dementia, severe intellectual disability, alcohol intoxication)   Negative: [1] MODERATE pain (e.g., interferes with normal activities) AND [2] high-risk adult (e.g., age > 60 years, osteoporosis, chronic steroid use)   Negative: [1] No prior tetanus  "shots (or is not fully vaccinated) AND [2] any wound (e.g., cut, scrape)   Negative: [1] HIV positive or severe immunodeficiency (severely weak immune system) AND [2] DIRTY cut or scrape   Negative: [1] Last tetanus shot > 5 years ago AND [2] DIRTY cut or scrape   Negative: [1] Last tetanus shot > 10 years ago AND [2] CLEAN cut or scrape (e.g., object AND skin were clean)    Answer Assessment - Initial Assessment Questions  1. MECHANISM: \"How did the injury happen?\"      [employer] class jumping on someone and landed on their knee to his left chest, ribs.    2. ONSET: \"When did the injury happen?\" (Minutes or hours ago)      One week  3. LOCATION: \"Where on the chest is the injury located?\"      Left ribes  4. APPEARANCE: \"What does the injury look like?\"      No swelling ,no bruising  5. BLEEDING: \"Is there any bleeding now? If Yes, ask: How long has it been bleeding?\"      na  6. SEVERITY: \"Any difficulty with breathing?\"      Breathing is good but painful to cough or sneeze  7. SIZE: For cuts, bruises, or swelling, ask: \"How large is it?\" (e.g., inches or centimeters)      na  8. PAIN: \"Is there pain?\" If Yes, ask: \"How bad is the pain?\"   (e.g., Scale 1-10; or mild, moderate, severe)      5  9. TETANUS: For any breaks in the skin, ask: \"When was the last tetanus booster?\"      na  10. PREGNANCY: \"Is there any chance you are pregnant?\" \"When was your last menstrual period?\"        na    Protocols used: Chest Injury-ADULT-    "